# Patient Record
Sex: MALE | Race: WHITE | NOT HISPANIC OR LATINO | Employment: FULL TIME | URBAN - NONMETROPOLITAN AREA
[De-identification: names, ages, dates, MRNs, and addresses within clinical notes are randomized per-mention and may not be internally consistent; named-entity substitution may affect disease eponyms.]

---

## 2020-10-02 ENCOUNTER — TELEPHONE (OUTPATIENT)
Dept: GASTROENTEROLOGY | Facility: CLINIC | Age: 50
End: 2020-10-02

## 2020-10-02 ENCOUNTER — TELEMEDICINE (OUTPATIENT)
Dept: GASTROENTEROLOGY | Facility: CLINIC | Age: 50
End: 2020-10-02

## 2020-10-02 VITALS — BODY MASS INDEX: 37.03 KG/M2 | WEIGHT: 250 LBS | HEIGHT: 69 IN

## 2020-10-02 DIAGNOSIS — Z12.11 ENCOUNTER FOR SCREENING FOR MALIGNANT NEOPLASM OF COLON: Primary | ICD-10-CM

## 2020-10-02 DIAGNOSIS — E11.9 TYPE 2 DIABETES MELLITUS WITHOUT COMPLICATION, WITHOUT LONG-TERM CURRENT USE OF INSULIN (HCC): ICD-10-CM

## 2020-10-02 DIAGNOSIS — I10 ESSENTIAL HYPERTENSION: ICD-10-CM

## 2020-10-02 DIAGNOSIS — Z80.0 FAMILY HX OF COLON CANCER: ICD-10-CM

## 2020-10-02 PROCEDURE — S0285 CNSLT BEFORE SCREEN COLONOSC: HCPCS | Performed by: NURSE PRACTITIONER

## 2020-10-02 RX ORDER — GLIMEPIRIDE 4 MG/1
4 TABLET ORAL DAILY
COMMUNITY
Start: 2020-07-06

## 2020-10-02 RX ORDER — LISINOPRIL AND HYDROCHLOROTHIAZIDE 20; 12.5 MG/1; MG/1
1 TABLET ORAL DAILY
COMMUNITY
Start: 2020-07-06

## 2020-10-02 RX ORDER — POLYETHYLENE GLYCOL 3350, SODIUM SULFATE ANHYDROUS, SODIUM BICARBONATE, SODIUM CHLORIDE, POTASSIUM CHLORIDE 236; 22.74; 6.74; 5.86; 2.97 G/4L; G/4L; G/4L; G/4L; G/4L
4 POWDER, FOR SOLUTION ORAL ONCE
Qty: 4000 ML | Refills: 0 | Status: SHIPPED | OUTPATIENT
Start: 2020-10-02 | End: 2020-10-02

## 2020-10-02 RX ORDER — AMLODIPINE BESYLATE 5 MG/1
5 TABLET ORAL DAILY
COMMUNITY
Start: 2020-07-06
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
## 2022-02-10 ENCOUNTER — PREP FOR SURGERY (OUTPATIENT)
Dept: OTHER | Facility: HOSPITAL | Age: 52
End: 2022-02-10

## 2022-02-10 DIAGNOSIS — Z80.0 FAMILY HISTORY OF COLON CANCER: ICD-10-CM

## 2022-02-10 DIAGNOSIS — Z12.11 ENCOUNTER FOR SCREENING FOR MALIGNANT NEOPLASM OF COLON: Primary | ICD-10-CM

## 2022-03-18 ENCOUNTER — ANESTHESIA EVENT (OUTPATIENT)
Dept: GASTROENTEROLOGY | Facility: HOSPITAL | Age: 52
End: 2022-03-18

## 2022-03-18 ENCOUNTER — HOSPITAL ENCOUNTER (OUTPATIENT)
Facility: HOSPITAL | Age: 52
Setting detail: HOSPITAL OUTPATIENT SURGERY
Discharge: HOME | End: 2022-03-18
Attending: INTERNAL MEDICINE | Admitting: INTERNAL MEDICINE

## 2022-03-18 ENCOUNTER — ANESTHESIA (OUTPATIENT)
Dept: GASTROENTEROLOGY | Facility: HOSPITAL | Age: 52
End: 2022-03-18

## 2022-03-18 VITALS
TEMPERATURE: 97.5 F | BODY MASS INDEX: 32.78 KG/M2 | OXYGEN SATURATION: 98 % | HEART RATE: 77 BPM | WEIGHT: 242 LBS | HEIGHT: 72 IN | SYSTOLIC BLOOD PRESSURE: 146 MMHG | DIASTOLIC BLOOD PRESSURE: 78 MMHG | RESPIRATION RATE: 18 BRPM

## 2022-03-18 DIAGNOSIS — Z80.0 FAMILY HISTORY OF COLON CANCER: ICD-10-CM

## 2022-03-18 DIAGNOSIS — Z12.11 ENCOUNTER FOR SCREENING FOR MALIGNANT NEOPLASM OF COLON: ICD-10-CM

## 2022-03-18 LAB — GLUCOSE BLDC GLUCOMTR-MCNC: 182 MG/DL (ref 70–130)

## 2022-03-18 PROCEDURE — 82962 GLUCOSE BLOOD TEST: CPT

## 2022-03-18 PROCEDURE — 45385 COLONOSCOPY W/LESION REMOVAL: CPT | Performed by: INTERNAL MEDICINE

## 2022-03-18 PROCEDURE — 25010000002 PROPOFOL 10 MG/ML EMULSION: Performed by: NURSE ANESTHETIST, CERTIFIED REGISTERED

## 2022-03-18 PROCEDURE — 88305 TISSUE EXAM BY PATHOLOGIST: CPT | Performed by: INTERNAL MEDICINE

## 2022-03-18 RX ORDER — LIDOCAINE HYDROCHLORIDE 10 MG/ML
0.5 INJECTION, SOLUTION EPIDURAL; INFILTRATION; INTRACAUDAL; PERINEURAL ONCE AS NEEDED
Status: DISCONTINUED | OUTPATIENT
Start: 2022-03-18 | End: 2022-03-18 | Stop reason: HOSPADM

## 2022-03-18 RX ORDER — ONDANSETRON 2 MG/ML
4 INJECTION INTRAMUSCULAR; INTRAVENOUS ONCE AS NEEDED
Status: DISCONTINUED | OUTPATIENT
Start: 2022-03-18 | End: 2022-03-18 | Stop reason: HOSPADM

## 2022-03-18 RX ORDER — LIDOCAINE HYDROCHLORIDE 20 MG/ML
INJECTION, SOLUTION EPIDURAL; INFILTRATION; INTRACAUDAL; PERINEURAL AS NEEDED
Status: DISCONTINUED | OUTPATIENT
Start: 2022-03-18 | End: 2022-03-18 | Stop reason: SURG

## 2022-03-18 RX ORDER — PROPOFOL 10 MG/ML
VIAL (ML) INTRAVENOUS AS NEEDED
Status: DISCONTINUED | OUTPATIENT
Start: 2022-03-18 | End: 2022-03-18 | Stop reason: SURG

## 2022-03-18 RX ORDER — SODIUM CHLORIDE 0.9 % (FLUSH) 0.9 %
10 SYRINGE (ML) INJECTION AS NEEDED
Status: DISCONTINUED | OUTPATIENT
Start: 2022-03-18 | End: 2022-03-18 | Stop reason: HOSPADM

## 2022-03-18 RX ORDER — SODIUM CHLORIDE 9 MG/ML
500 INJECTION, SOLUTION INTRAVENOUS CONTINUOUS PRN
Status: DISCONTINUED | OUTPATIENT
Start: 2022-03-18 | End: 2022-03-18 | Stop reason: HOSPADM

## 2022-03-18 RX ADMIN — LIDOCAINE HYDROCHLORIDE 60 MG: 20 INJECTION, SOLUTION EPIDURAL; INFILTRATION; INTRACAUDAL; PERINEURAL at 08:56

## 2022-03-18 RX ADMIN — SODIUM CHLORIDE 500 ML: 9 INJECTION, SOLUTION INTRAVENOUS at 07:47

## 2022-03-18 RX ADMIN — PROPOFOL 200 MG: 10 INJECTION, EMULSION INTRAVENOUS at 08:56

## 2022-03-18 RX ADMIN — PROPOFOL 140 MG: 10 INJECTION, EMULSION INTRAVENOUS at 08:59

## 2022-03-18 NOTE — ANESTHESIA POSTPROCEDURE EVALUATION
"Patient: Roger Brambila    Procedure Summary     Date: 03/18/22 Room / Location: [facility] ENDOSCOPY 5 / [facility] ENDOSCOPY    Anesthesia Start: 0853 Anesthesia Stop: 0920    Procedure: COLONOSCOPY (N/A ) Diagnosis:       Encounter for screening for malignant neoplasm of colon      Family history of colon cancer      (Encounter for screening for malignant neoplasm of colon [Z12.11])      (Family history of colon cancer [Z80.0])    Surgeons: Carroll Sosa MD Provider: Josef Herr CRNA    Anesthesia Type: MAC ASA Status: 2          Anesthesia Type: MAC    Vitals  Vitals Value Taken Time   /75 03/18/22 0931   Temp     Pulse 76 03/18/22 0934   Resp 17 03/18/22 0930   SpO2 98 % 03/18/22 0934   Vitals shown include unvalidated device data.        Post Anesthesia Care and Evaluation    Patient location during evaluation: PACU  Patient participation: complete - patient participated  Level of consciousness: awake and alert  Pain management: adequate  Airway patency: patent  Anesthetic complications: No anesthetic complications    Cardiovascular status: acceptable  Respiratory status: acceptable  Hydration status: acceptable    Comments: Blood pressure 105/75, pulse 78, temperature 97.5 °F (36.4 °C), temperature source Temporal, resp. rate 17, height 182.9 cm (72\"), weight 110 kg (242 lb), SpO2 95 %.    Pt discharged from PACU based on meme score >8      "

## 2022-03-18 NOTE — ANESTHESIA PREPROCEDURE EVALUATION
Anesthesia Evaluation     Patient summary reviewed   no history of anesthetic complications:  NPO Solid Status: > 8 hours             Airway   Mallampati: II  Dental      Pulmonary - negative pulmonary ROS   Cardiovascular   Exercise tolerance: excellent (>7 METS)    (+) hypertension,       Neuro/Psych- negative ROS  GI/Hepatic/Renal/Endo    (+) obesity,   diabetes mellitus,     Musculoskeletal     Abdominal    Substance History      OB/GYN          Other                        Anesthesia Plan    ASA 2     MAC       Anesthetic plan, all risks, benefits, and alternatives have been provided, discussed and informed consent has been obtained with: patient.        CODE STATUS:
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
## 2022-03-21 LAB
LAB AP CASE REPORT: NORMAL
PATH REPORT.FINAL DX SPEC: NORMAL
PATH REPORT.GROSS SPEC: NORMAL

## 2022-12-12 DIAGNOSIS — D45 POLYCYTHEMIA VERA (HCC): Primary | ICD-10-CM
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
## 2022-12-13 ENCOUNTER — HOSPITAL ENCOUNTER (OUTPATIENT)
Dept: INFUSION THERAPY | Age: 52
Discharge: HOME | End: 2022-12-13
Payer: COMMERCIAL

## 2022-12-13 ENCOUNTER — OFFICE VISIT (OUTPATIENT)
Dept: HEMATOLOGY | Age: 52
End: 2022-12-13
Payer: COMMERCIAL

## 2022-12-13 VITALS
HEART RATE: 84 BPM | SYSTOLIC BLOOD PRESSURE: 146 MMHG | HEIGHT: 70 IN | OXYGEN SATURATION: 96 % | WEIGHT: 252 LBS | BODY MASS INDEX: 36.08 KG/M2 | DIASTOLIC BLOOD PRESSURE: 100 MMHG

## 2022-12-13 DIAGNOSIS — D75.1 POLYCYTHEMIA: Primary | ICD-10-CM

## 2022-12-13 DIAGNOSIS — D45 POLYCYTHEMIA VERA (HCC): Primary | ICD-10-CM

## 2022-12-13 DIAGNOSIS — D75.1 POLYCYTHEMIA: ICD-10-CM

## 2022-12-13 LAB
BASOPHILS ABSOLUTE: 0.18 K/UL (ref 0.01–0.08)
BASOPHILS RELATIVE PERCENT: 1.5 % (ref 0.1–1.2)
EOSINOPHILS ABSOLUTE: 0.55 K/UL (ref 0.04–0.54)
EOSINOPHILS RELATIVE PERCENT: 4.6 % (ref 0.7–7)
FERRITIN: 73.4 NG/ML (ref 30–400)
HCT VFR BLD CALC: 55 % (ref 40.1–51)
HEMOGLOBIN: 18.7 G/DL (ref 13.7–17.5)
IRON SATURATION: 24 % (ref 14–50)
IRON: 110 UG/DL (ref 59–158)
LYMPHOCYTES ABSOLUTE: 2.2 K/UL (ref 1.18–3.74)
LYMPHOCYTES RELATIVE PERCENT: 18.2 % (ref 19.3–53.1)
MCH RBC QN AUTO: 29.6 PG (ref 25.7–32.2)
MCHC RBC AUTO-ENTMCNC: 34 G/DL (ref 32.3–36.5)
MCV RBC AUTO: 87 FL (ref 79–92.2)
MONOCYTES ABSOLUTE: 1.04 K/UL (ref 0.24–0.82)
MONOCYTES RELATIVE PERCENT: 8.6 % (ref 4.7–12.5)
NEUTROPHILS ABSOLUTE: 7.88 K/UL (ref 1.56–6.13)
NEUTROPHILS RELATIVE PERCENT: 65.2 % (ref 34–71.1)
PDW BLD-RTO: 13.3 % (ref 11.6–14.4)
PLATELET # BLD: 249 K/UL (ref 163–337)
PMV BLD AUTO: 10.9 FL (ref 7.4–10.4)
RBC # BLD: 6.32 M/UL (ref 4.63–6.08)
TOTAL IRON BINDING CAPACITY: 456 UG/DL (ref 250–400)
WBC # BLD: 12.08 K/UL (ref 4.23–9.07)

## 2022-12-13 PROCEDURE — 85025 COMPLETE CBC W/AUTO DIFF WBC: CPT

## 2022-12-13 PROCEDURE — 4004F PT TOBACCO SCREEN RCVD TLK: CPT | Performed by: PHYSICIAN ASSISTANT

## 2022-12-13 PROCEDURE — G8427 DOCREV CUR MEDS BY ELIG CLIN: HCPCS | Performed by: PHYSICIAN ASSISTANT

## 2022-12-13 PROCEDURE — 99212 OFFICE O/P EST SF 10 MIN: CPT

## 2022-12-13 PROCEDURE — 99203 OFFICE O/P NEW LOW 30 MIN: CPT | Performed by: PHYSICIAN ASSISTANT

## 2022-12-13 PROCEDURE — G8417 CALC BMI ABV UP PARAM F/U: HCPCS | Performed by: PHYSICIAN ASSISTANT

## 2022-12-13 PROCEDURE — 3017F COLORECTAL CA SCREEN DOC REV: CPT | Performed by: PHYSICIAN ASSISTANT

## 2022-12-13 PROCEDURE — G8484 FLU IMMUNIZE NO ADMIN: HCPCS | Performed by: PHYSICIAN ASSISTANT

## 2022-12-13 PROCEDURE — 36415 COLL VENOUS BLD VENIPUNCTURE: CPT

## 2022-12-13 PROCEDURE — 99195 PHLEBOTOMY: CPT

## 2022-12-13 RX ORDER — EMPAGLIFLOZIN 10 MG/1
TABLET, FILM COATED ORAL
COMMUNITY
Start: 2022-11-01

## 2022-12-13 RX ORDER — ORAL SEMAGLUTIDE 14 MG/1
TABLET ORAL
COMMUNITY
Start: 2022-11-15

## 2022-12-13 RX ORDER — ROSUVASTATIN CALCIUM 40 MG/1
TABLET, COATED ORAL
COMMUNITY
Start: 2022-11-14

## 2022-12-13 ASSESSMENT — ENCOUNTER SYMPTOMS
DIARRHEA: 0
WHEEZING: 0
VOICE CHANGE: 0
BLOOD IN STOOL: 0
ABDOMINAL DISTENTION: 0
EYE DISCHARGE: 0
NAUSEA: 0
VOMITING: 0
PHOTOPHOBIA: 0
BACK PAIN: 1
ABDOMINAL PAIN: 1
SHORTNESS OF BREATH: 0
TROUBLE SWALLOWING: 0
COLOR CHANGE: 0
EYE ITCHING: 0
SORE THROAT: 0
COUGH: 1
CONSTIPATION: 0

## 2022-12-13 NOTE — PROGRESS NOTES
THERAPEUTIC PHLEBOTOMY    Most recent Hemoglobin 18.7 Gm/dl and Hematocrit 55%    Date obtained: 12 /13 /2022      Pre-phlebotomy Vital Signs: Refer to Doc flow sheet    Start time: 1020    Tourniquet placed on patient's arm and arm palpated for venous access. Area of venous access cleansed with alcohol. Sheath removed from the needle and needle inserted into the right antecubital site. Blood flowing well down the tubing into collection bag. Adjustment/no adjustment of needle needed to maintain adequate blood flow. Scale monitoring amount of blood in bag. Stop Time: 1036  Needle removed from patient's arm. Pressure applied to patient's arm until bleeding stopped. Dry sterile dressing applied over puncture site and secured with Coban/self-adherent wrap. Final amount of blood removed: 250      Patient tolerated procedure well. No redness, edema, or signs of active bleeding noted. Discharge Instructions provided: No heavy pushing or lifting for the next 24 hours. Keep dressing dry and in place for 4 to 6 hours. If a fever GREATER than 100.5 develops, contact office. Patient discharged ambulatory with belongings.

## 2022-12-15 LAB — ERYTHROPOIETIN: 4 MU/ML (ref 4–27)

## 2022-12-21 ENCOUNTER — TELEPHONE (OUTPATIENT)
Dept: HEMATOLOGY | Age: 52
End: 2022-12-21

## 2022-12-22 LAB
BACKGROUND, 480093: NORMAL
BACKGROUND, 489163: NORMAL
CALR MUTATION DETECTION RESULT, 489451: NORMAL
DIRECTOR REVIEW: NORMAL
EXTRACTION: NORMAL
JAK2 EXONS 12-15 MUT DET PCR: NORMAL
JAK2 V617F MUTATION DETECTION 489201: NORMAL
Lab: NORMAL
METHOD: NORMAL
MPL MUTATION ANALYSIS RESULT: NORMAL
REFERENCES: NORMAL
REFERENCES: NORMAL
REFLEX: NORMAL

## 2022-12-30 ENCOUNTER — TELEPHONE (OUTPATIENT)
Dept: HEMATOLOGY | Age: 52
End: 2022-12-30

## 2022-12-30 NOTE — TELEPHONE ENCOUNTER
SABRINA Carr called patient to follow up on previous visit and to complete [facility] Screening. Patient was unavailable at this time and this SW was unable to leave a voice message.
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
## 2023-01-24 ENCOUNTER — OFFICE VISIT (OUTPATIENT)
Dept: HEMATOLOGY | Age: 53
End: 2023-01-24
Payer: COMMERCIAL

## 2023-01-24 ENCOUNTER — HOSPITAL ENCOUNTER (OUTPATIENT)
Dept: INFUSION THERAPY | Age: 53
Discharge: HOME | End: 2023-01-24
Payer: COMMERCIAL

## 2023-01-24 VITALS
DIASTOLIC BLOOD PRESSURE: 70 MMHG | OXYGEN SATURATION: 97 % | WEIGHT: 250 LBS | SYSTOLIC BLOOD PRESSURE: 122 MMHG | HEART RATE: 86 BPM | BODY MASS INDEX: 35.79 KG/M2 | HEIGHT: 70 IN

## 2023-01-24 DIAGNOSIS — D45 POLYCYTHEMIA VERA (HCC): Primary | ICD-10-CM

## 2023-01-24 DIAGNOSIS — D75.1 POLYCYTHEMIA: Primary | ICD-10-CM

## 2023-01-24 LAB
BASOPHILS ABSOLUTE: 0.13 K/UL (ref 0.01–0.08)
BASOPHILS RELATIVE PERCENT: 1.3 % (ref 0.1–1.2)
EOSINOPHILS ABSOLUTE: 0.45 K/UL (ref 0.04–0.54)
EOSINOPHILS RELATIVE PERCENT: 4.5 % (ref 0.7–7)
HCT VFR BLD CALC: 52.5 % (ref 40.1–51)
HEMOGLOBIN: 17.3 G/DL (ref 13.7–17.5)
LYMPHOCYTES ABSOLUTE: 2.25 K/UL (ref 1.18–3.74)
LYMPHOCYTES RELATIVE PERCENT: 22.6 % (ref 19.3–53.1)
MCH RBC QN AUTO: 29.2 PG (ref 25.7–32.2)
MCHC RBC AUTO-ENTMCNC: 33 G/DL (ref 32.3–36.5)
MCV RBC AUTO: 88.7 FL (ref 79–92.2)
MONOCYTES ABSOLUTE: 0.82 K/UL (ref 0.24–0.82)
MONOCYTES RELATIVE PERCENT: 8.2 % (ref 4.7–12.5)
NEUTROPHILS ABSOLUTE: 6.2 K/UL (ref 1.56–6.13)
NEUTROPHILS RELATIVE PERCENT: 62.5 % (ref 34–71.1)
PDW BLD-RTO: 13.7 % (ref 11.6–14.4)
PLATELET # BLD: 206 K/UL (ref 163–337)
PMV BLD AUTO: 11.6 FL (ref 7.4–10.4)
RBC # BLD: 5.92 M/UL (ref 4.63–6.08)
WBC # BLD: 9.94 K/UL (ref 4.23–9.07)

## 2023-01-24 PROCEDURE — 1036F TOBACCO NON-USER: CPT | Performed by: PHYSICIAN ASSISTANT

## 2023-01-24 PROCEDURE — 99213 OFFICE O/P EST LOW 20 MIN: CPT | Performed by: PHYSICIAN ASSISTANT

## 2023-01-24 PROCEDURE — 99195 PHLEBOTOMY: CPT

## 2023-01-24 PROCEDURE — 3017F COLORECTAL CA SCREEN DOC REV: CPT | Performed by: PHYSICIAN ASSISTANT

## 2023-01-24 PROCEDURE — 99212 OFFICE O/P EST SF 10 MIN: CPT

## 2023-01-24 PROCEDURE — 36415 COLL VENOUS BLD VENIPUNCTURE: CPT

## 2023-01-24 PROCEDURE — 85025 COMPLETE CBC W/AUTO DIFF WBC: CPT

## 2023-01-24 PROCEDURE — G8417 CALC BMI ABV UP PARAM F/U: HCPCS | Performed by: PHYSICIAN ASSISTANT

## 2023-01-24 PROCEDURE — G8427 DOCREV CUR MEDS BY ELIG CLIN: HCPCS | Performed by: PHYSICIAN ASSISTANT

## 2023-01-24 PROCEDURE — G8484 FLU IMMUNIZE NO ADMIN: HCPCS | Performed by: PHYSICIAN ASSISTANT

## 2023-01-24 ASSESSMENT — ENCOUNTER SYMPTOMS
PHOTOPHOBIA: 0
SORE THROAT: 0
BACK PAIN: 1
SHORTNESS OF BREATH: 0
EYE DISCHARGE: 0
WHEEZING: 0
EYE ITCHING: 0
TROUBLE SWALLOWING: 0
ABDOMINAL PAIN: 0
COUGH: 0
DIARRHEA: 0
ABDOMINAL DISTENTION: 0
BLOOD IN STOOL: 0
CONSTIPATION: 0
VOMITING: 0
COLOR CHANGE: 0
NAUSEA: 0
VOICE CHANGE: 0

## 2023-01-24 NOTE — PROGRESS NOTES
THERAPEUTIC PHLEBOTOMY    Most recent Hemoglobin 17.3 Gm/dl and Hematocrit 52.5%    Date obtained: 01 /24 /2023  Most recent Ferritin level 55.6 (If applicable)  Date obtained: 12 /13 /2022    Pre-phlebotomy Vital Signs: Refer to Doc flow sheet    Start time: 1050    Tourniquet placed on patient's arm and arm palpated for venous access. Area of venous access cleansed with alcohol. Sheath removed from the needle and needle inserted into the right antecubital site. Blood flowing well down the tubing into collection bag. Adjustment/no adjustment of needle needed to maintain adequate blood flow. Scale monitoring amount of blood in bag. Stop Time: 1106  Needle removed from patient's arm. Pressure applied to patient's arm until bleeding stopped. Dry sterile dressing applied over puncture site and secured with Coban/self-adherent wrap. Final amount of blood removed: 500 mL      Patient tolerated procedure well. No redness, edema, or signs of active bleeding noted. Discharge Instructions provided: No heavy pushing or lifting for the next 24 hours. Keep dressing dry and in place for 4 to 6 hours. If a fever GREATER than 100.5 develops, contact office. Patient discharged ambulatory with belongings.

## 2023-04-26 ENCOUNTER — TELEPHONE (OUTPATIENT)
Dept: INFUSION THERAPY | Age: 53
End: 2023-04-26

## 2023-04-26 DIAGNOSIS — D45 POLYCYTHEMIA VERA (HCC): Primary | ICD-10-CM

## 2023-04-26 NOTE — PROGRESS NOTES
Progress Note      Pt Name: Fox Mckeon  YOB: 1970  MRN: [id number]    Date of evaluation: 4/27/2023  History Obtained From:  patient, electronic medical record    CHIEF COMPLAINT:    Chief Complaint   Patient presents with    Follow-up     Pt here following up for Polycythemia. Current active problems  Secondary polycythemia    HISTORY OF PRESENT ILLNESS:    Fox Mckeon is a 48 y.o.  male followed in the office since 12/13/2022 for polycythemia. His work-up was unrevealing for MPN, elevated iron or abnormal erythropoietin. He appears to have a secondary polycythemia possibly related to undiagnosed sleep apnea. He has tolerated therapeutic phlebotomies well. He is getting phlebotomized whenever his hematocrit is above 53. He does have intermittent headaches. He does not have any significant headache today. He has intermittent itching just minimal itching at present. He denies any chest pain, visual disturbances. He does have diabetes mellitus continuing on metformin 1000 mg twice daily, Jardiance 10 mg daily, Rybelsus. He has essential hypertension on amlodipine 5 mg daily and lisinopril 5 mg daily. He does have hypercholesterolemia currently on rosuvastatin 40 daily. HEMATOLOGY HISTORY: Secondary polycythemia  Yola Guzman was seen in initial hematology consultation on 12/13/2022  referred by Dr. Essie Meyer for evaluation of polycythemia. CBC 1/14/2022 revealed a WBC of 8.0 with 57% PMN, 25% lymphocyte, 10% monocyte, Hgb 17.1, HCT 49.9, ,000. Yola Guzman denies any prior history of blood dyscrasia. He denies taking any exogenous testosterone. He does have a prior history of smoking but quit in 2011. He denies any diagnosis of COPD, sleep apnea at present. His initial CBC in the office on 12/13/2022 revealed a WBC of 12.08 with 65.2% PMN, 18.2% lymphocyte, 8.6% monocyte, 4.6% eosinophil, 1.5% basophil. Hgb 18.7, HCT 55, ,000.     I reviewed the

## 2023-04-27 ENCOUNTER — HOSPITAL ENCOUNTER (OUTPATIENT)
Dept: INFUSION THERAPY | Age: 53
Discharge: HOME | End: 2023-04-27
Payer: COMMERCIAL

## 2023-04-27 ENCOUNTER — OFFICE VISIT (OUTPATIENT)
Dept: HEMATOLOGY | Age: 53
End: 2023-04-27
Payer: COMMERCIAL

## 2023-04-27 VITALS
BODY MASS INDEX: 36.51 KG/M2 | WEIGHT: 255 LBS | OXYGEN SATURATION: 97 % | DIASTOLIC BLOOD PRESSURE: 90 MMHG | SYSTOLIC BLOOD PRESSURE: 130 MMHG | HEART RATE: 81 BPM | HEIGHT: 70 IN

## 2023-04-27 DIAGNOSIS — D45 POLYCYTHEMIA VERA (HCC): ICD-10-CM

## 2023-04-27 DIAGNOSIS — D75.1 POLYCYTHEMIA: Primary | ICD-10-CM

## 2023-04-27 LAB
BASOPHILS # BLD: 0.11 K/UL (ref 0.01–0.08)
BASOPHILS NFR BLD: 1.2 % (ref 0.1–1.2)
EOSINOPHIL # BLD: 0.39 K/UL (ref 0.04–0.54)
EOSINOPHIL NFR BLD: 4.2 % (ref 0.7–7)
ERYTHROCYTE [DISTWIDTH] IN BLOOD BY AUTOMATED COUNT: 12.6 % (ref 11.6–14.4)
HCT VFR BLD AUTO: 51.9 % (ref 40.1–51)
HGB BLD-MCNC: 16.7 G/DL (ref 13.7–17.5)
LYMPHOCYTES # BLD: 1.72 K/UL (ref 1.18–3.74)
LYMPHOCYTES NFR BLD: 18.6 % (ref 19.3–53.1)
MCH RBC QN AUTO: 28.3 PG (ref 25.7–32.2)
MCHC RBC AUTO-ENTMCNC: 32.2 G/DL (ref 32.3–36.5)
MCV RBC AUTO: 88 FL (ref 79–92.2)
MONOCYTES # BLD: 1.07 K/UL (ref 0.24–0.82)
MONOCYTES NFR BLD: 11.6 % (ref 4.7–12.5)
NEUTROPHILS # BLD: 5.83 K/UL (ref 1.56–6.13)
NEUTS SEG NFR BLD: 63.1 % (ref 34–71.1)
PLATELET # BLD AUTO: 183 K/UL (ref 163–337)
PMV BLD AUTO: 11.1 FL (ref 7.4–10.4)
RBC # BLD AUTO: 5.9 M/UL (ref 4.63–6.08)
WBC # BLD AUTO: 9.24 K/UL (ref 4.23–9.07)

## 2023-04-27 PROCEDURE — 36415 COLL VENOUS BLD VENIPUNCTURE: CPT

## 2023-04-27 PROCEDURE — 1036F TOBACCO NON-USER: CPT | Performed by: PHYSICIAN ASSISTANT

## 2023-04-27 PROCEDURE — 99211 OFF/OP EST MAY X REQ PHY/QHP: CPT

## 2023-04-27 PROCEDURE — 3017F COLORECTAL CA SCREEN DOC REV: CPT | Performed by: PHYSICIAN ASSISTANT

## 2023-04-27 PROCEDURE — G8417 CALC BMI ABV UP PARAM F/U: HCPCS | Performed by: PHYSICIAN ASSISTANT

## 2023-04-27 PROCEDURE — G8427 DOCREV CUR MEDS BY ELIG CLIN: HCPCS | Performed by: PHYSICIAN ASSISTANT

## 2023-04-27 PROCEDURE — 85025 COMPLETE CBC W/AUTO DIFF WBC: CPT

## 2023-04-27 PROCEDURE — 99213 OFFICE O/P EST LOW 20 MIN: CPT | Performed by: PHYSICIAN ASSISTANT

## 2023-04-27 RX ORDER — LISINOPRIL 5 MG/1
5 TABLET ORAL DAILY
COMMUNITY
Start: 2023-03-04

## 2023-04-27 RX ORDER — AMLODIPINE BESYLATE 5 MG/1
TABLET ORAL
COMMUNITY
Start: 2023-04-22

## 2023-04-27 ASSESSMENT — ENCOUNTER SYMPTOMS
VOICE CHANGE: 0
COUGH: 0
ABDOMINAL DISTENTION: 0
COLOR CHANGE: 0
DIARRHEA: 0
CONSTIPATION: 0
SORE THROAT: 0
SHORTNESS OF BREATH: 0
BLOOD IN STOOL: 0
NAUSEA: 0
EYE DISCHARGE: 0
ABDOMINAL PAIN: 0
VOMITING: 0
TROUBLE SWALLOWING: 0
BACK PAIN: 1
WHEEZING: 0
EYE ITCHING: 0
PHOTOPHOBIA: 0

## 2023-07-19 ENCOUNTER — TELEPHONE (OUTPATIENT)
Dept: INFUSION THERAPY | Age: 53
End: 2023-07-19

## 2023-07-19 NOTE — PROGRESS NOTES
erythema or rash. Neurological:      Mental Status: He is alert and oriented to person, place, and time. Psychiatric:         Thought Content: Thought content normal.     CBC reviewed by me  Lab Results   Component Value Date    WBC 10.14 (H) 07/20/2023    HGB 16.6 07/20/2023    HCT 49.1 07/20/2023    MCV 84.8 07/20/2023     07/20/2023     Lab Results   Component Value Date    NEUTROABS 5.77 07/20/2023       VISIT DIAGNOSES  1. Polycythemia        ASSESSMENT/PLAN:    1. Polycythemia -suspect secondary-possibly related to undiagnosed sleep apnea    He may have undiagnosed sleep apnea. Plan for therapeutic phlebotomy for HCT > 53. HCT today is only 49.1. He will not require a therapeutic phlebotomy. HEALTH MAINTENANCE    Colon cancer screening. Colonoscopy at [facility] on 3/18/2022 - One 6 mm polyp in the proximal transverse colon (PATH - tubular adenoma) one 4 mm polyp in the distal transverse colon (PATH benign polypoid colonic mucosa) One 5 mm polyp in the sigmoid colon (tubular adenoma-negative for high-grade dysplasia or malignancy). Follow-up 3 years    Prostate cancer screening. Per PCP       Return in about 4 months (around 11/20/2023) for With Alexandra Scott possible therapeutic phlebotomy.      Irina Higginbotham PA-C  3:01 PM  7/20/2023

## 2023-07-20 ENCOUNTER — HOSPITAL ENCOUNTER (OUTPATIENT)
Dept: INFUSION THERAPY | Age: 53
Discharge: HOME | End: 2023-07-20
Payer: COMMERCIAL

## 2023-07-20 ENCOUNTER — OFFICE VISIT (OUTPATIENT)
Dept: HEMATOLOGY | Age: 53
End: 2023-07-20
Payer: COMMERCIAL

## 2023-07-20 VITALS
DIASTOLIC BLOOD PRESSURE: 90 MMHG | WEIGHT: 256 LBS | BODY MASS INDEX: 36.73 KG/M2 | SYSTOLIC BLOOD PRESSURE: 130 MMHG | HEART RATE: 90 BPM | OXYGEN SATURATION: 96 %

## 2023-07-20 DIAGNOSIS — D75.1 POLYCYTHEMIA: Primary | ICD-10-CM

## 2023-07-20 DIAGNOSIS — D45 POLYCYTHEMIA VERA (HCC): ICD-10-CM

## 2023-07-20 LAB
ERYTHROCYTE [DISTWIDTH] IN BLOOD BY AUTOMATED COUNT: 13.1 % (ref 11.6–14.4)
HCT VFR BLD AUTO: 49.1 % (ref 40.1–51)
HGB BLD-MCNC: 16.6 G/DL (ref 13.7–17.5)
LYMPHOCYTES # BLD: 2.62 K/UL (ref 1.18–3.74)
LYMPHOCYTES NFR BLD: 25.8 % (ref 19.3–53.1)
MCH RBC QN AUTO: 28.7 PG (ref 25.7–32.2)
MCHC RBC AUTO-ENTMCNC: 33.8 G/DL (ref 32.3–36.5)
MCV RBC AUTO: 84.8 FL (ref 79–92.2)
MONOCYTES # BLD: 0.99 K/UL (ref 0.24–0.82)
MONOCYTES NFR BLD: 9.8 % (ref 4.7–12.5)
NEUTROPHILS # BLD: 5.77 K/UL (ref 1.56–6.13)
NEUTS SEG NFR BLD: 56.9 % (ref 34–71.1)
PLATELET # BLD AUTO: 220 K/UL (ref 163–337)
PMV BLD AUTO: 10.7 FL (ref 7.4–10.4)
RBC # BLD AUTO: 5.79 M/UL (ref 4.63–6.08)
WBC # BLD AUTO: 10.14 K/UL (ref 4.23–9.07)

## 2023-07-20 PROCEDURE — 1036F TOBACCO NON-USER: CPT | Performed by: PHYSICIAN ASSISTANT

## 2023-07-20 PROCEDURE — 36415 COLL VENOUS BLD VENIPUNCTURE: CPT

## 2023-07-20 PROCEDURE — G8427 DOCREV CUR MEDS BY ELIG CLIN: HCPCS | Performed by: PHYSICIAN ASSISTANT

## 2023-07-20 PROCEDURE — G8417 CALC BMI ABV UP PARAM F/U: HCPCS | Performed by: PHYSICIAN ASSISTANT

## 2023-07-20 PROCEDURE — 99213 OFFICE O/P EST LOW 20 MIN: CPT | Performed by: PHYSICIAN ASSISTANT

## 2023-07-20 PROCEDURE — 85025 COMPLETE CBC W/AUTO DIFF WBC: CPT

## 2023-07-20 PROCEDURE — 3017F COLORECTAL CA SCREEN DOC REV: CPT | Performed by: PHYSICIAN ASSISTANT

## 2023-07-20 PROCEDURE — 99211 OFF/OP EST MAY X REQ PHY/QHP: CPT

## 2023-07-20 ASSESSMENT — ENCOUNTER SYMPTOMS
EYE ITCHING: 0
BLOOD IN STOOL: 0
VOMITING: 0
COLOR CHANGE: 0
ABDOMINAL DISTENTION: 0
TROUBLE SWALLOWING: 0
COUGH: 0
NAUSEA: 0
ABDOMINAL PAIN: 0
BACK PAIN: 1
PHOTOPHOBIA: 0
CONSTIPATION: 0
SHORTNESS OF BREATH: 0
SORE THROAT: 0
WHEEZING: 0
DIARRHEA: 0
VOICE CHANGE: 0
EYE DISCHARGE: 0

## 2023-08-23 ENCOUNTER — HOSPITAL ENCOUNTER (EMERGENCY)
Age: 53
Discharge: ANOTHER HOSPITAL | End: 2023-08-23
Attending: EMERGENCY MEDICINE
Payer: COMMERCIAL

## 2023-08-23 ENCOUNTER — APPOINTMENT (OUTPATIENT)
Dept: GENERAL RADIOLOGY | Age: 53
End: 2023-08-23
Payer: COMMERCIAL

## 2023-08-23 VITALS
OXYGEN SATURATION: 97 % | SYSTOLIC BLOOD PRESSURE: 109 MMHG | WEIGHT: 260 LBS | BODY MASS INDEX: 37.31 KG/M2 | TEMPERATURE: 97 F | DIASTOLIC BLOOD PRESSURE: 60 MMHG | HEART RATE: 89 BPM | RESPIRATION RATE: 20 BRPM
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
## 2023-08-23 DIAGNOSIS — S09.90XA TRAUMATIC INJURY OF HEAD, INITIAL ENCOUNTER: Primary | ICD-10-CM

## 2023-08-23 LAB
ALBUMIN SERPL-MCNC: 3.9 G/DL (ref 3.5–5.2)
ALP SERPL-CCNC: 131 U/L (ref 40–130)
ALT SERPL-CCNC: 330 U/L (ref 5–41)
ANION GAP SERPL CALCULATED.3IONS-SCNC: 15 MMOL/L (ref 7–19)
AST SERPL-CCNC: 432 U/L (ref 5–40)
BASOPHILS # BLD: 0.2 K/UL (ref 0–0.2)
BASOPHILS NFR BLD: 1.2 % (ref 0–1)
BILIRUB SERPL-MCNC: <0.2 MG/DL (ref 0.2–1.2)
BUN SERPL-MCNC: 24 MG/DL (ref 6–20)
CALCIUM SERPL-MCNC: 8.3 MG/DL (ref 8.6–10)
CHLORIDE SERPL-SCNC: 94 MMOL/L (ref 98–111)
CO2 SERPL-SCNC: 19 MMOL/L (ref 22–29)
CREAT SERPL-MCNC: 1.1 MG/DL (ref 0.5–1.2)
EOSINOPHIL # BLD: 0.4 K/UL (ref 0–0.6)
EOSINOPHIL NFR BLD: 2.2 % (ref 0–5)
ERYTHROCYTE [DISTWIDTH] IN BLOOD BY AUTOMATED COUNT: 13 % (ref 11.5–14.5)
GLUCOSE SERPL-MCNC: 586 MG/DL (ref 74–109)
HCT VFR BLD AUTO: 51.1 % (ref 42–52)
HGB BLD-MCNC: 16.8 G/DL (ref 14–18)
IMM GRANULOCYTES # BLD: 0.7 K/UL
LYMPHOCYTES # BLD: 3.3 K/UL (ref 1.1–4.5)
LYMPHOCYTES NFR BLD: 19.7 % (ref 20–40)
MCH RBC QN AUTO: 28.8 PG (ref 27–31)
MCHC RBC AUTO-ENTMCNC: 32.9 G/DL (ref 33–37)
MCV RBC AUTO: 87.7 FL (ref 80–94)
MONOCYTES # BLD: 0.9 K/UL (ref 0–0.9)
MONOCYTES NFR BLD: 5.1 % (ref 0–10)
NEUTROPHILS # BLD: 11.3 K/UL (ref 1.5–7.5)
NEUTS SEG NFR BLD: 67.4 % (ref 50–65)
PLATELET # BLD AUTO: 358 K/UL (ref 130–400)
PMV BLD AUTO: 10.8 FL (ref 9.4–12.4)
POTASSIUM SERPL-SCNC: 3.8 MMOL/L (ref 3.5–5)
PROT SERPL-MCNC: 6.9 G/DL (ref 6.6–8.7)
RBC # BLD AUTO: 5.83 M/UL (ref 4.7–6.1)
SODIUM SERPL-SCNC: 128 MMOL/L (ref 136–145)
WBC # BLD AUTO: 16.8 K/UL (ref 4.8–10.8)

## 2023-08-23 PROCEDURE — 85025 COMPLETE CBC W/AUTO DIFF WBC: CPT

## 2023-08-23 PROCEDURE — 6360000002 HC RX W HCPCS

## 2023-08-23 PROCEDURE — 2580000003 HC RX 258: Performed by: EMERGENCY MEDICINE

## 2023-08-23 PROCEDURE — 71045 X-RAY EXAM CHEST 1 VIEW: CPT

## 2023-08-23 PROCEDURE — 72170 X-RAY EXAM OF PELVIS: CPT

## 2023-08-23 PROCEDURE — 96374 THER/PROPH/DIAG INJ IV PUSH: CPT

## 2023-08-23 PROCEDURE — 80053 COMPREHEN METABOLIC PANEL: CPT

## 2023-08-23 PROCEDURE — 31500 INSERT EMERGENCY AIRWAY: CPT

## 2023-08-23 PROCEDURE — 36415 COLL VENOUS BLD VENIPUNCTURE: CPT

## 2023-08-23 PROCEDURE — 99285 EMERGENCY DEPT VISIT HI MDM: CPT

## 2023-08-23 RX ORDER — PROPOFOL 10 MG/ML
INJECTION, EMULSION INTRAVENOUS
Status: COMPLETED
Start: 2023-08-23 | End: 2023-08-23

## 2023-08-23 RX ORDER — PROPOFOL 10 MG/ML
5-50 INJECTION, EMULSION INTRAVENOUS CONTINUOUS
Status: DISCONTINUED | OUTPATIENT
Start: 2023-08-23 | End: 2023-08-23 | Stop reason: HOSPADM

## 2023-08-23 RX ORDER — SODIUM CHLORIDE, SODIUM LACTATE, POTASSIUM CHLORIDE, AND CALCIUM CHLORIDE .6; .31; .03; .02 G/100ML; G/100ML; G/100ML; G/100ML
1000 INJECTION, SOLUTION INTRAVENOUS ONCE
Status: COMPLETED | OUTPATIENT
Start: 2023-08-23 | End: 2023-08-23

## 2023-08-23 RX ORDER — LORAZEPAM 2 MG/ML
INJECTION INTRAMUSCULAR
Status: COMPLETED
Start: 2023-08-23 | End: 2023-08-23

## 2023-08-23 RX ADMIN — PROPOFOL 5.02 MCG/KG/MIN: 10 INJECTION, EMULSION INTRAVENOUS at 09:05

## 2023-08-23 RX ADMIN — PROPOFOL 1000 MG: 10 INJECTION, EMULSION INTRAVENOUS at 08:24

## 2023-08-23 RX ADMIN — SODIUM CHLORIDE, SODIUM LACTATE, POTASSIUM CHLORIDE, AND CALCIUM CHLORIDE 1000 ML: 600; 310; 30; 20 INJECTION, SOLUTION INTRAVENOUS at 08:44

## 2023-08-23 RX ADMIN — LORAZEPAM 2 MG: 2 INJECTION INTRAMUSCULAR; INTRAVENOUS at 08:44

## 2023-08-23 ASSESSMENT — PULMONARY FUNCTION TESTS: PIF_VALUE: 23

## 2023-08-23 NOTE — PROGRESS NOTES
Pt intubated at 0811 with 7.5 ett at 23 cm stephon at lip. Pt had igel in place and exchanged to to an ett. Pt placed on vent AC 16 500 60% 5 peep v.o Dr. Maria Velarde. Tube pushed down to 24cm stephon at lip after x ray.

## 2023-08-23 NOTE — ED NOTES
Air Evac at the bedside OG placed by [employer].  Air vac Medic administering Ketamine at this time     Chepe Rogers, [address]  08/23/23 2590

## 2023-08-23 NOTE — ED NOTES
TXA and blood products started by [facility].  Blood started at [place], [place]  08/23/23 4037
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
## 2023-08-23 NOTE — ED NOTES
Nurse report to Amrita Fields RN by Linda Hylton.  [facility] RN     Andres Miranda, MITRA  08/23/23 4931

## 2023-08-23 NOTE — ED NOTES
Pt bucking vent MD aware of low b/p and pt not reting fully on vent     Neil Tarango RN  08/23/23 4590
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
## 2023-08-23 NOTE — ED NOTES
[facility]   ED to ED     Dr. Porter Travis, accepting     Report: [phone], Opt. 4    Fax chart: [address] [place] [address]  08/23/23 2550
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
## 2023-08-23 NOTE — ED NOTES
Accompanied Dr. Dia Reveal to speak with pt family to update on pt condition and pending [employer] flight to [facility].        Ayah Renteria RN  08/23/23 4389

## 2023-08-26 RX ORDER — LORAZEPAM 2 MG/ML
2 INJECTION INTRAMUSCULAR ONCE
Status: ACTIVE | OUTPATIENT
Start: 2023-08-23

## 2023-11-28 ENCOUNTER — TELEPHONE (OUTPATIENT)
Dept: HEMATOLOGY | Age: 53
End: 2023-11-28
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
## 2024-03-01 ENCOUNTER — TELEPHONE (OUTPATIENT)
Dept: HEMATOLOGY | Age: 54
End: 2024-03-01

## 2024-03-01 NOTE — TELEPHONE ENCOUNTER
Called pt to remind them of their appt on 03/05/2024 but was unable to leave message or speak to the patient due to invalid number or number was disconnected.

## 2024-04-04 ENCOUNTER — TRANSCRIBE ORDERS (OUTPATIENT)
Dept: ADMINISTRATIVE | Facility: HOSPITAL | Age: 54
End: 2024-04-04
Payer: COMMERCIAL

## 2024-04-04 ENCOUNTER — HOSPITAL ENCOUNTER (OUTPATIENT)
Age: 54
Discharge: HOME | End: 2024-04-04

## 2024-04-04 ENCOUNTER — OFFICE VISIT (OUTPATIENT)
Dept: WOUND CARE | Facility: HOSPITAL | Age: 54
End: 2024-04-04
Payer: OTHER MISCELLANEOUS

## 2024-04-04 ENCOUNTER — LAB (OUTPATIENT)
Dept: LAB | Facility: HOSPITAL | Age: 54
End: 2024-04-04
Payer: COMMERCIAL

## 2024-04-04 ENCOUNTER — HOSPITAL ENCOUNTER (OUTPATIENT)
Dept: GENERAL RADIOLOGY | Facility: HOSPITAL | Age: 54
Discharge: HOME | End: 2024-04-04
Payer: OTHER MISCELLANEOUS

## 2024-04-04 ENCOUNTER — LAB REQUISITION (OUTPATIENT)
Dept: LAB | Facility: HOSPITAL | Age: 54
End: 2024-04-04
Payer: OTHER MISCELLANEOUS

## 2024-04-04 DIAGNOSIS — E11.621 TYPE 2 DIABETES MELLITUS WITH FOOT ULCER (CODE): ICD-10-CM

## 2024-04-04 DIAGNOSIS — E11.621 TYPE 2 DIABETES MELLITUS WITH FOOT ULCER (CODE): Primary | ICD-10-CM

## 2024-04-04 LAB
ALBUMIN SERPL-MCNC: 4.4 G/DL (ref 3.5–5.2)
ALBUMIN/GLOB SERPL: 1.3 G/DL
ALP SERPL-CCNC: 130 U/L (ref 39–117)
ALT SERPL W P-5'-P-CCNC: 16 U/L (ref 1–41)
ANION GAP SERPL CALCULATED.3IONS-SCNC: 10 MMOL/L (ref 5–15)
AST SERPL-CCNC: 14 U/L (ref 1–40)
BACTERIA #/AREA URNS HPF: NORMAL /HPF
BASOPHILS # BLD AUTO: 0.08 10*3/MM3 (ref 0–0.2)
BASOPHILS NFR BLD AUTO: 1.2 % (ref 0–1.5)
BILIRUB SERPL-MCNC: 0.4 MG/DL (ref 0–1.2)
BILIRUB UR QL STRIP: NEGATIVE
BUN SERPL-MCNC: 22 MG/DL (ref 6–20)
BUN/CREAT SERPL: 26.8 (ref 7–25)
CALCIUM SPEC-SCNC: 9.2 MG/DL (ref 8.6–10.5)
CHLORIDE SERPL-SCNC: 102 MMOL/L (ref 98–107)
CLARITY UR: CLEAR
CO2 SERPL-SCNC: 25 MMOL/L (ref 22–29)
COLOR UR: YELLOW
CREAT SERPL-MCNC: 0.82 MG/DL (ref 0.76–1.27)
CRP SERPL-MCNC: <0.3 MG/DL (ref 0–0.5)
DEPRECATED RDW RBC AUTO: 48.2 FL (ref 37–54)
EGFRCR SERPLBLD CKD-EPI 2021: 104.4 ML/MIN/1.73
EOSINOPHIL # BLD AUTO: 0.28 10*3/MM3 (ref 0–0.4)
EOSINOPHIL NFR BLD AUTO: 4.1 % (ref 0.3–6.2)
ERYTHROCYTE [DISTWIDTH] IN BLOOD BY AUTOMATED COUNT: 14.7 % (ref 12.3–15.4)
ERYTHROCYTE [SEDIMENTATION RATE] IN BLOOD: 20 MM/HR (ref 0–20)
GLOBULIN UR ELPH-MCNC: 3.5 GM/DL
GLUCOSE SERPL-MCNC: 106 MG/DL (ref 65–99)
GLUCOSE UR STRIP.AUTO-MCNC: =>1000 MG/DL
HBA1C MFR BLD: 6.3 % (ref 4.8–5.6)
HCT VFR BLD AUTO: 42.6 % (ref 37.5–51)
HGB BLD-MCNC: 13.5 G/DL (ref 13–17.7)
HGB UR STRIP.AUTO-MCNC: NEGATIVE MG/L
IMM GRANULOCYTES # BLD AUTO: 0.02 10*3/MM3 (ref 0–0.05)
IMM GRANULOCYTES NFR BLD AUTO: 0.3 % (ref 0–0.5)
KETONES UR STRIP.AUTO-MCNC: NEGATIVE MG/DL
LEUKOCYTE ESTERASE UR QL STRIP.AUTO: NEGATIVE
LYMPHOCYTES # BLD AUTO: 1.39 10*3/MM3 (ref 0.7–3.1)
LYMPHOCYTES NFR BLD AUTO: 20.6 % (ref 19.6–45.3)
MCH RBC QN AUTO: 28.1 PG (ref 26.6–33)
MCHC RBC AUTO-ENTMCNC: 31.7 G/DL (ref 31.5–35.7)
MCV RBC AUTO: 88.6 FL (ref 79–97)
MONOCYTES # BLD AUTO: 0.63 10*3/MM3 (ref 0.1–0.9)
MONOCYTES NFR BLD AUTO: 9.3 % (ref 5–12)
NEUTROPHILS NFR BLD AUTO: 4.36 10*3/MM3 (ref 1.7–7)
NEUTROPHILS NFR BLD AUTO: 64.5 % (ref 42.7–76)
NITRITE UR QL STRIP.AUTO: NEGATIVE
NRBC BLD AUTO-RTO: 0 /100 WBC (ref 0–0.2)
PH UR STRIP.AUTO: 5.5 [PH] (ref 5–8)
PLATELET # BLD AUTO: 276 10*3/MM3 (ref 140–450)
PMV BLD AUTO: 10.3 FL (ref 6–12)
POTASSIUM SERPL-SCNC: 4.7 MMOL/L (ref 3.5–5.2)
PREALB SERPL-MCNC: 23.5 MG/DL (ref 20–40)
PROT SERPL-MCNC: 7.9 G/DL (ref 6–8.5)
PROT UR STRIP.AUTO-MCNC: 30 MG/DL
RBC # BLD AUTO: 4.81 10*6/MM3 (ref 4.14–5.8)
RBC #/AREA URNS HPF: NORMAL /HPF (ref 0–2)
SODIUM SERPL-SCNC: 137 MMOL/L (ref 136–145)
SP GR UR STRIP.AUTO: 1.03 (ref 1–1.03)
SQUAMOUS #/AREA URNS HPF: NORMAL /HPF
UROBILINOGEN UR STRIP.AUTO-MCNC: 0.2 E.U./DL
WBC #/AREA URNS HPF: NORMAL /HPF (ref 0–5)
WBC NRBC COR # BLD AUTO: 6.76 10*3/MM3 (ref 3.4–10.8)

## 2024-04-04 PROCEDURE — 87075 CULTR BACTERIA EXCEPT BLOOD: CPT | Performed by: NURSE PRACTITIONER

## 2024-04-04 PROCEDURE — 85652 RBC SED RATE AUTOMATED: CPT

## 2024-04-04 PROCEDURE — 73630 X-RAY EXAM OF FOOT: CPT

## 2024-04-04 PROCEDURE — 87070 CULTURE OTHR SPECIMN AEROBIC: CPT | Performed by: NURSE PRACTITIONER

## 2024-04-04 PROCEDURE — 87205 SMEAR GRAM STAIN: CPT | Performed by: NURSE PRACTITIONER

## 2024-04-04 PROCEDURE — 84134 ASSAY OF PREALBUMIN: CPT

## 2024-04-04 PROCEDURE — 80053 COMPREHEN METABOLIC PANEL: CPT

## 2024-04-04 PROCEDURE — 87186 SC STD MICRODIL/AGAR DIL: CPT | Performed by: NURSE PRACTITIONER

## 2024-04-04 PROCEDURE — 86140 C-REACTIVE PROTEIN: CPT

## 2024-04-04 PROCEDURE — 85025 COMPLETE CBC W/AUTO DIFF WBC: CPT

## 2024-04-04 PROCEDURE — G0463 HOSPITAL OUTPT CLINIC VISIT: HCPCS

## 2024-04-04 PROCEDURE — 87176 TISSUE HOMOGENIZATION CULTR: CPT | Performed by: NURSE PRACTITIONER

## 2024-04-04 PROCEDURE — 36415 COLL VENOUS BLD VENIPUNCTURE: CPT

## 2024-04-04 PROCEDURE — 87147 CULTURE TYPE IMMUNOLOGIC: CPT | Performed by: NURSE PRACTITIONER

## 2024-04-04 PROCEDURE — 83036 HEMOGLOBIN GLYCOSYLATED A1C: CPT

## 2024-04-07 LAB
BACTERIA SPEC AEROBE CULT: ABNORMAL
BACTERIA SPEC ANAEROBE CULT: NORMAL
GRAM STN SPEC: ABNORMAL
GRAM STN SPEC: ABNORMAL

## 2024-04-09 LAB — BACTERIA SPEC ANAEROBE CULT: NORMAL

## 2024-04-19 ENCOUNTER — OFFICE VISIT (OUTPATIENT)
Dept: WOUND CARE | Facility: HOSPITAL | Age: 54
End: 2024-04-19
Payer: OTHER MISCELLANEOUS

## 2024-04-19 PROCEDURE — G0463 HOSPITAL OUTPT CLINIC VISIT: HCPCS

## 2024-04-26 LAB
ALBUMIN SERPL-MCNC: 4.2 G/DL (ref 3.5–5.2)
ALP SERPL-CCNC: 99 U/L (ref 40–130)
ALT SERPL-CCNC: 14 U/L (ref 5–41)
ANION GAP SERPL CALCULATED.3IONS-SCNC: 12 MMOL/L (ref 7–19)
AST SERPL-CCNC: 12 U/L (ref 5–40)
BASOPHILS # BLD: 0.1 K/UL (ref 0–0.2)
BASOPHILS NFR BLD: 0.9 % (ref 0–1)
BILIRUB SERPL-MCNC: 0.3 MG/DL (ref 0.2–1.2)
BUN SERPL-MCNC: 19 MG/DL (ref 6–20)
CALCIUM SERPL-MCNC: 9.3 MG/DL (ref 8.6–10)
CHLORIDE SERPL-SCNC: 101 MMOL/L (ref 98–111)
CO2 SERPL-SCNC: 24 MMOL/L (ref 22–29)
CREAT SERPL-MCNC: 0.7 MG/DL (ref 0.5–1.2)
EOSINOPHIL # BLD: 0.1 K/UL (ref 0–0.6)
EOSINOPHIL NFR BLD: 1.9 % (ref 0–5)
ERYTHROCYTE [DISTWIDTH] IN BLOOD BY AUTOMATED COUNT: 14.2 % (ref 11.5–14.5)
GLUCOSE SERPL-MCNC: 166 MG/DL (ref 74–109)
HCT VFR BLD AUTO: 42.2 % (ref 42–52)
HGB BLD-MCNC: 13.5 G/DL (ref 14–18)
IMM GRANULOCYTES # BLD: 0 K/UL
LYMPHOCYTES # BLD: 1.2 K/UL (ref 1.1–4.5)
LYMPHOCYTES NFR BLD: 18.2 % (ref 20–40)
MCH RBC QN AUTO: 28.3 PG (ref 27–31)
MCHC RBC AUTO-ENTMCNC: 32 G/DL (ref 33–37)
MCV RBC AUTO: 88.5 FL (ref 80–94)
MONOCYTES # BLD: 0.5 K/UL (ref 0–0.9)
MONOCYTES NFR BLD: 7.4 % (ref 0–10)
NEUTROPHILS # BLD: 4.8 K/UL (ref 1.5–7.5)
NEUTS SEG NFR BLD: 71.2 % (ref 50–65)
PLATELET # BLD AUTO: 303 K/UL (ref 130–400)
PMV BLD AUTO: 10.2 FL (ref 9.4–12.4)
POTASSIUM SERPL-SCNC: 4.5 MMOL/L (ref 3.5–5)
PROT SERPL-MCNC: 7.1 G/DL (ref 6.6–8.7)
RBC # BLD AUTO: 4.77 M/UL (ref 4.7–6.1)
SODIUM SERPL-SCNC: 137 MMOL/L (ref 136–145)
T4 FREE SERPL-MCNC: 1.47 NG/DL (ref 0.93–1.7)
TSH SERPL DL<=0.005 MIU/L-ACNC: 1.07 UIU/ML (ref 0.27–4.2)
WBC # BLD AUTO: 6.7 K/UL (ref 4.8–10.8)

## 2024-04-27 LAB — T3 SERPL-MCNC: 80 NG/DL (ref 80–200)

## 2024-04-29 LAB — HBA1C MFR BLD: 6.4 % (ref 4–6)

## 2024-05-03 ENCOUNTER — OFFICE VISIT (OUTPATIENT)
Dept: WOUND CARE | Facility: HOSPITAL | Age: 54
End: 2024-05-03
Payer: OTHER MISCELLANEOUS

## 2024-05-03 PROCEDURE — G0463 HOSPITAL OUTPT CLINIC VISIT: HCPCS

## 2024-05-06 RX ORDER — BUPRENORPHINE 10 UG/H
1 PATCH TRANSDERMAL WEEKLY
COMMUNITY

## 2024-05-06 RX ORDER — ACETAMINOPHEN 325 MG/1
325 TABLET ORAL EVERY 6 HOURS PRN
COMMUNITY
Start: 2023-12-15

## 2024-05-06 RX ORDER — POLYETHYLENE GLYCOL 3350 17 G/17G
17 POWDER, FOR SOLUTION ORAL DAILY PRN
COMMUNITY

## 2024-05-06 RX ORDER — DOXAZOSIN MESYLATE 4 MG/1
4 TABLET ORAL NIGHTLY
COMMUNITY

## 2024-05-06 RX ORDER — SENNOSIDES A AND B 8.6 MG/1
1 TABLET, FILM COATED ORAL PRN
COMMUNITY

## 2024-05-06 RX ORDER — QUETIAPINE FUMARATE 50 MG/1
50 TABLET, FILM COATED ORAL PRN
COMMUNITY
Start: 2023-12-15

## 2024-05-06 RX ORDER — AMANTADINE HYDROCHLORIDE 100 MG/1
100 CAPSULE, GELATIN COATED ORAL 2 TIMES DAILY
COMMUNITY
Start: 2024-03-19

## 2024-05-06 RX ORDER — FAMOTIDINE 20 MG/1
20 TABLET, FILM COATED ORAL 2 TIMES DAILY
COMMUNITY

## 2024-05-06 RX ORDER — ATORVASTATIN CALCIUM 40 MG/1
40 TABLET, FILM COATED ORAL DAILY
COMMUNITY

## 2024-05-06 RX ORDER — ONDANSETRON 4 MG/1
4 TABLET, FILM COATED ORAL EVERY 8 HOURS PRN
COMMUNITY

## 2024-05-06 RX ORDER — DORZOLAMIDE HCL 20 MG/ML
1 SOLUTION/ DROPS OPHTHALMIC 2 TIMES DAILY
COMMUNITY

## 2024-05-06 RX ORDER — PROPRANOLOL HYDROCHLORIDE 20 MG/1
20 TABLET ORAL 3 TIMES DAILY
COMMUNITY

## 2024-05-06 RX ORDER — BRIMONIDINE TARTRATE 2 MG/ML
1 SOLUTION/ DROPS OPHTHALMIC 3 TIMES DAILY
COMMUNITY

## 2024-05-06 RX ORDER — OXYCODONE HYDROCHLORIDE 5 MG/1
5 TABLET ORAL EVERY 8 HOURS PRN
COMMUNITY

## 2024-05-06 RX ORDER — TRIAMCINOLONE ACETONIDE 1 MG/G
CREAM TOPICAL 2 TIMES DAILY
COMMUNITY

## 2024-05-14 ENCOUNTER — OFFICE VISIT (OUTPATIENT)
Dept: GASTROENTEROLOGY | Age: 54
End: 2024-05-14
Payer: COMMERCIAL

## 2024-05-14 VITALS
SYSTOLIC BLOOD PRESSURE: 160 MMHG | BODY MASS INDEX: 21.67 KG/M2 | OXYGEN SATURATION: 97 % | DIASTOLIC BLOOD PRESSURE: 100 MMHG | WEIGHT: 160 LBS | HEIGHT: 72 IN | HEART RATE: 75 BPM

## 2024-05-14 DIAGNOSIS — R13.10 DYSPHAGIA, UNSPECIFIED TYPE: ICD-10-CM

## 2024-05-14 DIAGNOSIS — R11.2 NAUSEA AND VOMITING, UNSPECIFIED VOMITING TYPE: Primary | ICD-10-CM

## 2024-05-14 PROCEDURE — 3017F COLORECTAL CA SCREEN DOC REV: CPT | Performed by: NURSE PRACTITIONER

## 2024-05-14 PROCEDURE — G8420 CALC BMI NORM PARAMETERS: HCPCS | Performed by: NURSE PRACTITIONER

## 2024-05-14 PROCEDURE — 1036F TOBACCO NON-USER: CPT | Performed by: NURSE PRACTITIONER

## 2024-05-14 PROCEDURE — 99204 OFFICE O/P NEW MOD 45 MIN: CPT | Performed by: NURSE PRACTITIONER

## 2024-05-14 PROCEDURE — G8427 DOCREV CUR MEDS BY ELIG CLIN: HCPCS | Performed by: NURSE PRACTITIONER

## 2024-05-14 RX ORDER — ALPRAZOLAM 0.25 MG/1
0.25 TABLET ORAL NIGHTLY PRN
COMMUNITY

## 2024-05-14 RX ORDER — OMEPRAZOLE 40 MG/1
40 CAPSULE, DELAYED RELEASE ORAL DAILY
Qty: 30 CAPSULE | Refills: 11 | Status: SHIPPED | OUTPATIENT
Start: 2024-05-14

## 2024-05-14 ASSESSMENT — ENCOUNTER SYMPTOMS
SHORTNESS OF BREATH: 0
ABDOMINAL DISTENTION: 0
TROUBLE SWALLOWING: 0
DIARRHEA: 0
ABDOMINAL PAIN: 0
CHOKING: 0
BLOOD IN STOOL: 0
COUGH: 0
VOMITING: 1
CONSTIPATION: 0
ANAL BLEEDING: 0
NAUSEA: 1

## 2024-05-14 NOTE — PROGRESS NOTES
cuff 12/9/2015    Diabetes mellitus (HCC)     Hypertension     Prolonged emergence from general anesthesia        Past Surgical History:   Procedure Laterality Date    CYSTOSCOPY      PILONIDAL CYST EXCISION      SHOULDER ARTHROSCOPY Right 12/9/2015    SHOULDER ARTHROSCOPY ROTATOR CUFF REPAIR,RT SHOULDER ARTHROSCOPIC RCR, SAD performed by Calvin Mo MD at [facility] OR       No family history on file.    Social History     Socioeconomic History    Marital status:    Tobacco Use    Smoking status: Never     Passive exposure: Yes   Substance and Sexual Activity    Alcohol use: No       Current Outpatient Medications   Medication Sig Dispense Refill    ALPRAZolam (XANAX) 0.25 MG tablet Take 1 tablet by mouth nightly as needed for Sleep. Max Daily Amount: 0.25 mg      omeprazole (PRILOSEC) 40 MG delayed release capsule Take 1 capsule by mouth daily Take first thing daily on an empty stomach. 30 capsule 11    amantadine (SYMMETREL) 100 MG capsule Take 1 capsule by mouth 2 times daily      atorvastatin (LIPITOR) 40 MG tablet Take 1 tablet by mouth daily      brimonidine (ALPHAGAN) 0.2 % ophthalmic solution 1 drop 3 times daily      buprenorphine (BUPRENEX) 10 MCG/HR PTWK Place 1 patch onto the skin once a week.      dorzolamide (TRUSOPT) 2 % ophthalmic solution Place 1 drop into the left eye in the morning and at bedtime      doxazosin (CARDURA) 4 MG tablet Take 1 tablet by mouth nightly      famotidine (PEPCID) 20 MG tablet Take 1 tablet by mouth 2 times daily      MAGNESIUM-OXIDE PO Take 400 mg by mouth in the morning and at bedtime      propranolol (INDERAL) 20 MG tablet Take 1 tablet by mouth 3 times daily      acetaminophen (TYLENOL) 325 MG tablet Take 1 tablet by mouth every 6 hours as needed for Fever      ondansetron (ZOFRAN) 4 MG tablet Take 1 tablet by mouth every 8 hours as needed for Vomiting or Nausea      oxyCODONE (ROXICODONE) 5 MG immediate release tablet Take 1 tablet by mouth every 8 hours as needed

## 2024-05-29 ENCOUNTER — TELEPHONE (OUTPATIENT)
Dept: GASTROENTEROLOGY | Age: 54
End: 2024-05-29

## 2024-05-29 ENCOUNTER — ANESTHESIA EVENT (OUTPATIENT)
Dept: ENDOSCOPY | Age: 54
End: 2024-05-29
Payer: COMMERCIAL

## 2024-05-29 NOTE — TELEPHONE ENCOUNTER
[facility] called stating this patient is scheduled for an Endoscopy tomorrow but they wanted to make sure we had the Workers Comp information. I did tell them it was already in the system from seeing someone within [facility] before but could not see in the referral information where the workers comp was GI related so we put W/C as primary but then entered in his [facility] as a secondary form of payment. She voiced understanding stating they were also trying to determine if it was GI related as well and if it would truly fall under workers comp so she said us putting in [facility] was a good thing because that way it could be a back up method of payment. I voiced understanding.

## 2024-05-30 ENCOUNTER — ANESTHESIA (OUTPATIENT)
Dept: ENDOSCOPY | Age: 54
End: 2024-05-30
Payer: COMMERCIAL

## 2024-05-30 ENCOUNTER — HOSPITAL ENCOUNTER (OUTPATIENT)
Age: 54
Setting detail: OUTPATIENT SURGERY
Discharge: NURSING, REHAB OR LONG-TERM CARE | End: 2024-05-30
Attending: INTERNAL MEDICINE | Admitting: INTERNAL MEDICINE
Payer: COMMERCIAL

## 2024-05-30 VITALS
HEIGHT: 72 IN | SYSTOLIC BLOOD PRESSURE: 165 MMHG | BODY MASS INDEX: 24.11 KG/M2 | OXYGEN SATURATION: 100 % | RESPIRATION RATE: 16 BRPM | TEMPERATURE: 97.4 F | DIASTOLIC BLOOD PRESSURE: 89 MMHG | WEIGHT: 178 LBS | HEART RATE: 72 BPM

## 2024-05-30 DIAGNOSIS — R11.2 NAUSEA AND VOMITING, UNSPECIFIED VOMITING TYPE: ICD-10-CM

## 2024-05-30 DIAGNOSIS — R13.10 DYSPHAGIA, UNSPECIFIED TYPE: ICD-10-CM

## 2024-05-30 LAB
GLUCOSE BLD-MCNC: 110 MG/DL (ref 70–99)
PERFORMED ON: ABNORMAL

## 2024-05-30 PROCEDURE — 7100000011 HC PHASE II RECOVERY - ADDTL 15 MIN: Performed by: INTERNAL MEDICINE

## 2024-05-30 PROCEDURE — 3700000001 HC ADD 15 MINUTES (ANESTHESIA): Performed by: INTERNAL MEDICINE

## 2024-05-30 PROCEDURE — 7100000010 HC PHASE II RECOVERY - FIRST 15 MIN: Performed by: INTERNAL MEDICINE

## 2024-05-30 PROCEDURE — 88305 TISSUE EXAM BY PATHOLOGIST: CPT

## 2024-05-30 PROCEDURE — 2580000003 HC RX 258: Performed by: INTERNAL MEDICINE

## 2024-05-30 PROCEDURE — 3609015300 HC ESOPHAGEAL DILATION MALONEY: Performed by: INTERNAL MEDICINE

## 2024-05-30 PROCEDURE — 43239 EGD BIOPSY SINGLE/MULTIPLE: CPT | Performed by: INTERNAL MEDICINE

## 2024-05-30 PROCEDURE — 43450 DILATE ESOPHAGUS 1/MULT PASS: CPT | Performed by: INTERNAL MEDICINE

## 2024-05-30 PROCEDURE — 6360000002 HC RX W HCPCS: Performed by: NURSE ANESTHETIST, CERTIFIED REGISTERED

## 2024-05-30 PROCEDURE — 3609012400 HC EGD TRANSORAL BIOPSY SINGLE/MULTIPLE: Performed by: INTERNAL MEDICINE

## 2024-05-30 PROCEDURE — 2500000003 HC RX 250 WO HCPCS: Performed by: NURSE ANESTHETIST, CERTIFIED REGISTERED

## 2024-05-30 PROCEDURE — 88342 IMHCHEM/IMCYTCHM 1ST ANTB: CPT

## 2024-05-30 PROCEDURE — 3700000000 HC ANESTHESIA ATTENDED CARE: Performed by: INTERNAL MEDICINE

## 2024-05-30 PROCEDURE — 2709999900 HC NON-CHARGEABLE SUPPLY: Performed by: INTERNAL MEDICINE

## 2024-05-30 PROCEDURE — 82962 GLUCOSE BLOOD TEST: CPT

## 2024-05-30 RX ORDER — HYDROMORPHONE HYDROCHLORIDE 1 MG/ML
0.5 INJECTION, SOLUTION INTRAMUSCULAR; INTRAVENOUS; SUBCUTANEOUS EVERY 5 MIN PRN
Status: CANCELLED | OUTPATIENT
Start: 2024-05-30

## 2024-05-30 RX ORDER — DIPHENHYDRAMINE HYDROCHLORIDE 50 MG/ML
12.5 INJECTION INTRAMUSCULAR; INTRAVENOUS
Status: CANCELLED | OUTPATIENT
Start: 2024-05-30 | End: 2024-05-31

## 2024-05-30 RX ORDER — NALOXONE HYDROCHLORIDE 0.4 MG/ML
INJECTION, SOLUTION INTRAMUSCULAR; INTRAVENOUS; SUBCUTANEOUS PRN
Status: CANCELLED | OUTPATIENT
Start: 2024-05-30

## 2024-05-30 RX ORDER — METOCLOPRAMIDE HYDROCHLORIDE 5 MG/ML
10 INJECTION INTRAMUSCULAR; INTRAVENOUS
Status: CANCELLED | OUTPATIENT
Start: 2024-05-30 | End: 2024-05-31

## 2024-05-30 RX ORDER — MEPERIDINE HYDROCHLORIDE 25 MG/ML
12.5 INJECTION INTRAMUSCULAR; INTRAVENOUS; SUBCUTANEOUS EVERY 5 MIN PRN
Status: CANCELLED | OUTPATIENT
Start: 2024-05-30

## 2024-05-30 RX ORDER — SODIUM CHLORIDE, SODIUM LACTATE, POTASSIUM CHLORIDE, CALCIUM CHLORIDE 600; 310; 30; 20 MG/100ML; MG/100ML; MG/100ML; MG/100ML
INJECTION, SOLUTION INTRAVENOUS CONTINUOUS
Status: DISCONTINUED | OUTPATIENT
Start: 2024-05-30 | End: 2024-05-30 | Stop reason: HOSPADM

## 2024-05-30 RX ORDER — SODIUM CHLORIDE 0.9 % (FLUSH) 0.9 %
5-40 SYRINGE (ML) INJECTION PRN
Status: CANCELLED | OUTPATIENT
Start: 2024-05-30

## 2024-05-30 RX ORDER — FENTANYL CITRATE 50 UG/ML
INJECTION, SOLUTION INTRAMUSCULAR; INTRAVENOUS PRN
Status: DISCONTINUED | OUTPATIENT
Start: 2024-05-30 | End: 2024-05-30 | Stop reason: SDUPTHER

## 2024-05-30 RX ORDER — LIDOCAINE HYDROCHLORIDE 10 MG/ML
INJECTION, SOLUTION EPIDURAL; INFILTRATION; INTRACAUDAL; PERINEURAL PRN
Status: DISCONTINUED | OUTPATIENT
Start: 2024-05-30 | End: 2024-05-30 | Stop reason: SDUPTHER

## 2024-05-30 RX ORDER — PROPOFOL 10 MG/ML
INJECTION, EMULSION INTRAVENOUS PRN
Status: DISCONTINUED | OUTPATIENT
Start: 2024-05-30 | End: 2024-05-30 | Stop reason: SDUPTHER

## 2024-05-30 RX ORDER — HYDROMORPHONE HYDROCHLORIDE 1 MG/ML
0.25 INJECTION, SOLUTION INTRAMUSCULAR; INTRAVENOUS; SUBCUTANEOUS EVERY 5 MIN PRN
Status: CANCELLED | OUTPATIENT
Start: 2024-05-30

## 2024-05-30 RX ORDER — SODIUM CHLORIDE 9 MG/ML
INJECTION, SOLUTION INTRAVENOUS PRN
Status: CANCELLED | OUTPATIENT
Start: 2024-05-30

## 2024-05-30 RX ORDER — SODIUM CHLORIDE 0.9 % (FLUSH) 0.9 %
5-40 SYRINGE (ML) INJECTION EVERY 12 HOURS SCHEDULED
Status: CANCELLED | OUTPATIENT
Start: 2024-05-30

## 2024-05-30 RX ADMIN — PROPOFOL 25 MG: 10 INJECTION, EMULSION INTRAVENOUS at 10:20

## 2024-05-30 RX ADMIN — FENTANYL CITRATE 50 MCG: 50 INJECTION INTRAMUSCULAR; INTRAVENOUS at 10:19

## 2024-05-30 RX ADMIN — FENTANYL CITRATE 50 MCG: 50 INJECTION INTRAMUSCULAR; INTRAVENOUS at 10:17

## 2024-05-30 RX ADMIN — PROPOFOL 70 MG: 10 INJECTION, EMULSION INTRAVENOUS at 10:15

## 2024-05-30 RX ADMIN — LIDOCAINE HYDROCHLORIDE 50 MG: 10 INJECTION, SOLUTION EPIDURAL; INFILTRATION; INTRACAUDAL; PERINEURAL at 10:15

## 2024-05-30 RX ADMIN — PROPOFOL 100 MG: 10 INJECTION, EMULSION INTRAVENOUS at 10:17

## 2024-05-30 RX ADMIN — SODIUM CHLORIDE, POTASSIUM CHLORIDE, SODIUM LACTATE AND CALCIUM CHLORIDE: 600; 310; 30; 20 INJECTION, SOLUTION INTRAVENOUS at 10:06

## 2024-05-30 ASSESSMENT — PAIN - FUNCTIONAL ASSESSMENT
PAIN_FUNCTIONAL_ASSESSMENT: 0-10
PAIN_FUNCTIONAL_ASSESSMENT: NONE - DENIES PAIN

## 2024-05-30 NOTE — ANESTHESIA PRE PROCEDURE
Department of Anesthesiology  Preprocedure Note       Name:  Franicsco Ennis   Age:  54 y.o.  :  1970                                          MRN:  [id number]         Date:  2024      Surgeon: Surgeon(s):  Becky Perdue MD    Procedure: Procedure(s):  ESOPHAGOGASTRODUODENOSCOPY BIOPSY    Medications prior to admission:   Prior to Admission medications    Medication Sig Start Date End Date Taking? Authorizing Provider   ALPRAZolam (XANAX) 0.25 MG tablet Take 1 tablet by mouth nightly as needed for Sleep. Max Daily Amount: 0.25 mg    Adryan Alejandro MD   omeprazole (PRILOSEC) 40 MG delayed release capsule Take 1 capsule by mouth daily Take first thing daily on an empty stomach. 24   Renate Washington APRN - NP   amantadine (SYMMETREL) 100 MG capsule Take 1 capsule by mouth 2 times daily 3/19/24   Adryan Alejandro MD   atorvastatin (LIPITOR) 40 MG tablet Take 1 tablet by mouth daily    Adryan Alejandro MD   brimonidine (ALPHAGAN) 0.2 % ophthalmic solution 1 drop 3 times daily    Adryan Alejandro MD   buprenorphine (BUPRENEX) 10 MCG/HR PTWK Place 1 patch onto the skin once a week.    Adryan Alejandro MD   dorzolamide (TRUSOPT) 2 % ophthalmic solution Place 1 drop into the left eye in the morning and at bedtime    Adryan Alejandro MD   doxazosin (CARDURA) 4 MG tablet Take 1 tablet by mouth nightly    Adryan Alejandro MD   famotidine (PEPCID) 20 MG tablet Take 1 tablet by mouth 2 times daily    Adryan Alejandro MD   MAGNESIUM-OXIDE PO Take 400 mg by mouth in the morning and at bedtime    Adryan Alejandro MD   propranolol (INDERAL) 20 MG tablet Take 1 tablet by mouth 3 times daily    Adryan Alejandro MD   acetaminophen (TYLENOL) 325 MG tablet Take 1 tablet by mouth every 6 hours as needed for Fever 12/15/23   Adryan Alejandro MD   ondansetron (ZOFRAN) 4 MG tablet Take 1 tablet by mouth every 8 hours as needed for Vomiting or Nausea

## 2024-05-30 NOTE — H&P
Patient Name: Francisco Ennis  : 1970  MRN: [id number]  DATE: 24    Allergies: No Known Allergies     ENDOSCOPY  History and Physical    Procedure:    [] Diagnostic Colonoscopy       [] Screening Colonoscopy  [x] EGD      [] ERCP      [] EUS       [] Other    [x] Previous office notes/History and Physical reviewed from the patients chart. Please see EMR for further details of HPI. I have examined the patient's status immediately prior to the procedure and:      Indications/HPI:        1. Nausea and vomiting, unspecified vomiting type    2. Dysphagia, unspecified type      []Abdominal Pain   []Cancer- GI/Lung     []Fhx of colon CA/polyps  []History of Polyps  []Barretts            []Melena  []Abnormal Imaging              []Dysphagia              []Persistent Pneumonia   []Anemia                            []Food Impaction        []History of Polyps  [] GI Bleed             []Pulmonary nodule/Mass   []Change in bowel habits []Heartburn/Reflux  []Rectal Bleed (BRBPR)  []Chest Pain - Non Cardiac []Heme (+) Stool []Ulcers  []Constipation  []Hemoptysis  []Varices  []Diarrhea  []Hypoxemia    []Nausea/Vomiting   []Screening   []Crohns/Colitis  []Other:     Anesthesia:   [x] MAC [] Moderate Sedation   [] General   [] None     ROS: 12 pt Review of Symptoms was negative unless mentioned above    Medications:   Prior to Admission medications    Medication Sig Start Date End Date Taking? Authorizing Provider   ALPRAZolam (XANAX) 0.25 MG tablet Take 1 tablet by mouth nightly as needed for Sleep.    ProviderAdryan MD   omeprazole (PRILOSEC) 40 MG delayed release capsule Take 1 capsule by mouth daily Take first thing daily on an empty stomach. 24   Renate Washington, APRN - NP   amantadine (SYMMETREL) 100 MG capsule Take 1 capsule by mouth 2 times daily 3/19/24   ProviderAdryan MD   atorvastatin (LIPITOR) 40 MG tablet Take 1 tablet by mouth daily    ProviderAdryan MD   brimonidine

## 2024-05-30 NOTE — ANESTHESIA POSTPROCEDURE EVALUATION
Department of Anesthesiology  Postprocedure Note    Patient: Francisco Ennis  MRN: [id number]  YOB: 1970  Date of evaluation: 5/30/2024    Procedure Summary       Date: 05/30/24 Room / Location: [place] [address] / [facility]    Anesthesia Start: 1010 Anesthesia Stop:     Procedures:       ESOPHAGOGASTRODUODENOSCOPY BIOPSY (Abdomen)      ESOPHAGEAL DILATION NATALIE Diagnosis:       Nausea and vomiting, unspecified vomiting type      Dysphagia, unspecified type      (Nausea and vomiting, unspecified vomiting type [R11.2])      (Dysphagia, unspecified type [R13.10])    Surgeons: Becky Perdue MD Responsible Provider: Chuck Olivarez APRN - CRNA    Anesthesia Type: MAC ASA Status: 2            Anesthesia Type: No value filed.    Beverly Phase I: Beverly Score: 10    Beverly Phase II:      Anesthesia Post Evaluation    Patient location during evaluation: bedside  Patient participation: waiting for patient participation  Level of consciousness: responsive to light touch  Pain score: 0  Airway patency: patent  Nausea & Vomiting: no vomiting and no nausea  Cardiovascular status: blood pressure returned to baseline  Respiratory status: acceptable  Hydration status: stable  Pain management: adequate        No notable events documented.

## 2024-05-30 NOTE — OP NOTE
Endoscopic Procedure Note    Patient: Francisco Ennis : 1970  Med Rec#: [id number] Acc#: [id number]     Primary Care Provider Catrachito Orourke MD    Endoscopist: Becky Perdue MD, MD    Date of Procedure:  2024    Procedure:   EGD with    Corral bougie dilation of esophagus and  Cold biopsies    Indications:   1. Nausea and vomiting, unspecified vomiting type    2. Dysphagia, unspecified type    3.  History of tracheostomy    Anesthesia:  Sedation was administered by anesthesia who monitored the patient during the procedure.    Estimated Blood Loss: minimal    Procedure:   After reviewing the patient's chart and obtaining informed consent, the patient was placed in the left lateral decubitus position.  A forward-viewing Olympus endoscope was lubricated and inserted through the mouth into the oropharynx. Under direct visualization, the upper esophagus was intubated. The scope was advanced to the level of the third portion of duodenum. Scope was slowly withdrawn with careful inspection of the mucosal surfaces. The scope was retroflexed for inspection of the gastric fundus and incisura. Findings and maneuvers are listed in impression below.     Next, a lubricated Corral weighted Bougie dilator-54 Fr was gently introduced into the patient's mouth and passed into the Esophagus and into the proximal stomach without much resistance and then withdrawn. Repeat EGD was performed to verify dilation and scope tip was passed into the stomach. NO evidence of perforation or excessive bleeding was noted subsequent to the dilation.        The patient tolerated the procedure well. The scope was removed. There were no immediate complications.    Findings/IMPRESSION:  Esophagus: normal and a normal EG junction at 42 cm.    NO erosions or ulcers or nodules or strictures or webs or rings or mass lesions or extrinsic compression or diverticula noted. An empirical Corral 54 fr bougie dilation was performed 
WDL

## 2024-05-30 NOTE — DISCHARGE INSTRUCTIONS
1.  Await path results, the patient will be contacted in 7-10 days with biopsy results.   2.  Magic mouthwash 5 ml PO Swish and swallow q3h PRN ONLY IF patient has post-procedural sorethroat or chest pain.  3. Full liquids to soft diet today patti discharge from the surgicenter; may advance diet starting in AM tomorrow.  4. May resume other meds except any ASA/NSAIDs; may use cough drops or lozenges PRN; also continue meds for GERD with anti-GERD measures.  5. NO ASA/NSAIDs x 2 weeks  6. OP f/u in 6-8 weeks with  ; will consider an Esophageal manometry later if the patient's dysphagia persists.     Upper GI Endoscopy: What to Expect at Home  Your Recovery  You had an upper GI endoscopy. Your doctor used a thin, lighted tube that bends to look at the inside of your esophagus, your stomach, and the first part of the small intestine, called the duodenum.    How can you care for yourself at home?  Activity   Rest as much as you need to after you go home.  You should be able to go back to your usual activities the day after the test.  Due to anesthesia, no driving or operating equipment for 24 hours.  Diet   Follow your doctor's directions for eating after the test.  Drink plenty of fluids (unless your doctor has told you not to).  Medications   If you have a sore throat the day after the test, use an over-the-counter spray to numb your throat.  When should you call for help?   Call 911 anytime you think you may need emergency care. For example, call if:    You passed out (lost consciousness).     You have trouble breathing.     You pass maroon or bloody stools.   Call your doctor now or seek immediate medical care if:    You have pain that does not get better after your take pain medicine.     You have new or worse belly pain.     You have blood in your stools.     You are sick to your stomach and cannot keep fluids down.     You have a fever.     You cannot pass stools or gas.   Watch closely for changes in

## 2024-06-03 ENCOUNTER — TRANSCRIBE ORDERS (OUTPATIENT)
Dept: ADMINISTRATIVE | Facility: HOSPITAL | Age: 54
End: 2024-06-03
Payer: COMMERCIAL

## 2024-06-03 DIAGNOSIS — N18.2 CHRONIC RENAL DISEASE, STAGE II: Primary | ICD-10-CM

## 2024-06-10 ENCOUNTER — HOSPITAL ENCOUNTER (OUTPATIENT)
Dept: ULTRASOUND IMAGING | Facility: HOSPITAL | Age: 54
Discharge: HOME | End: 2024-06-10
Admitting: INTERNAL MEDICINE
Payer: COMMERCIAL

## 2024-06-10 PROCEDURE — 76775 US EXAM ABDO BACK WALL LIM: CPT

## 2024-07-02 ENCOUNTER — OFFICE VISIT (OUTPATIENT)
Dept: GASTROENTEROLOGY | Age: 54
End: 2024-07-02

## 2024-07-02 VITALS
BODY MASS INDEX: 24.11 KG/M2 | DIASTOLIC BLOOD PRESSURE: 80 MMHG | SYSTOLIC BLOOD PRESSURE: 130 MMHG | OXYGEN SATURATION: 98 % | HEART RATE: 68 BPM | HEIGHT: 72 IN | WEIGHT: 178 LBS

## 2024-07-02 DIAGNOSIS — K21.00 GASTROESOPHAGEAL REFLUX DISEASE WITH ESOPHAGITIS WITHOUT HEMORRHAGE: Primary | ICD-10-CM

## 2024-07-02 DIAGNOSIS — K29.50 MILD CHRONIC GASTRITIS: ICD-10-CM

## 2024-07-02 ASSESSMENT — ENCOUNTER SYMPTOMS
ABDOMINAL DISTENTION: 0
DIARRHEA: 0
TROUBLE SWALLOWING: 0
RECTAL PAIN: 0
BLOOD IN STOOL: 0
ABDOMINAL PAIN: 0
CONSTIPATION: 0
CHOKING: 0
COUGH: 0
NAUSEA: 0
SHORTNESS OF BREATH: 0
VOMITING: 0
ANAL BLEEDING: 0
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
## 2024-07-03 ENCOUNTER — APPOINTMENT (OUTPATIENT)
Dept: CT IMAGING | Facility: HOSPITAL | Age: 54
End: 2024-07-03
Payer: COMMERCIAL

## 2024-07-03 ENCOUNTER — HOSPITAL ENCOUNTER (EMERGENCY)
Facility: HOSPITAL | Age: 54
Discharge: HOME | End: 2024-07-03
Attending: STUDENT IN AN ORGANIZED HEALTH CARE EDUCATION/TRAINING PROGRAM
Payer: COMMERCIAL

## 2024-07-03 VITALS
OXYGEN SATURATION: 99 % | DIASTOLIC BLOOD PRESSURE: 76 MMHG | WEIGHT: 189 LBS | HEART RATE: 73 BPM | RESPIRATION RATE: 18 BRPM | BODY MASS INDEX: 25.6 KG/M2 | SYSTOLIC BLOOD PRESSURE: 152 MMHG | TEMPERATURE: 98 F | HEIGHT: 72 IN

## 2024-07-03 DIAGNOSIS — S09.90XA INJURY OF HEAD, INITIAL ENCOUNTER: Primary | ICD-10-CM

## 2024-07-03 DIAGNOSIS — S01.91XA LACERATION OF HEAD WITHOUT FOREIGN BODY, UNSPECIFIED PART OF HEAD, INITIAL ENCOUNTER: ICD-10-CM

## 2024-07-03 PROCEDURE — 70450 CT HEAD/BRAIN W/O DYE: CPT

## 2024-07-03 PROCEDURE — 90715 TDAP VACCINE 7 YRS/> IM: CPT | Performed by: STUDENT IN AN ORGANIZED HEALTH CARE EDUCATION/TRAINING PROGRAM

## 2024-07-03 PROCEDURE — 25010000002 TETANUS-DIPHTH-ACELL PERTUSSIS 5-2.5-18.5 LF-MCG/0.5 SUSPENSION PREFILLED SYRINGE: Performed by: STUDENT IN AN ORGANIZED HEALTH CARE EDUCATION/TRAINING PROGRAM

## 2024-07-03 PROCEDURE — 90471 IMMUNIZATION ADMIN: CPT | Performed by: STUDENT IN AN ORGANIZED HEALTH CARE EDUCATION/TRAINING PROGRAM

## 2024-07-03 PROCEDURE — 99284 EMERGENCY DEPT VISIT MOD MDM: CPT

## 2024-07-03 PROCEDURE — 72125 CT NECK SPINE W/O DYE: CPT

## 2024-07-03 RX ADMIN — Medication 3 ML: at 01:44

## 2024-07-03 RX ADMIN — TETANUS TOXOID, REDUCED DIPHTHERIA TOXOID AND ACELLULAR PERTUSSIS VACCINE, ADSORBED 0.5 ML: 5; 2.5; 8; 8; 2.5 SUSPENSION INTRAMUSCULAR at 01:43

## 2024-07-03 NOTE — DISCHARGE INSTRUCTIONS
Please come back for signs of confusion, severe headache, or any new emergencies.  You have sutures that do not need to be removed and will absorb on their own.  Come back if there are any signs of wound infection like redness swelling or drainage
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
## 2024-08-01 ENCOUNTER — HOSPITAL ENCOUNTER (OUTPATIENT)
Dept: GENERAL RADIOLOGY | Age: 54
Discharge: HOME | End: 2024-08-01
Payer: COMMERCIAL

## 2024-08-01 ENCOUNTER — HOSPITAL ENCOUNTER (OUTPATIENT)
Dept: CT IMAGING | Age: 54
Discharge: HOME | End: 2024-08-01
Payer: COMMERCIAL

## 2024-08-01 DIAGNOSIS — S46.011S STRAIN OF TENDON OF RIGHT ROTATOR CUFF, SEQUELA: ICD-10-CM

## 2024-08-01 DIAGNOSIS — S46.001A INJURY OF TENDON OF RIGHT ROTATOR CUFF, INITIAL ENCOUNTER: ICD-10-CM

## 2024-08-01 PROCEDURE — 73201 CT UPPER EXTREMITY W/DYE: CPT

## 2024-08-01 PROCEDURE — 73040 CONTRAST X-RAY OF SHOULDER: CPT

## 2024-08-01 PROCEDURE — 6360000004 HC RX CONTRAST MEDICATION: Performed by: ORTHOPAEDIC SURGERY

## 2024-08-01 RX ORDER — IOPAMIDOL 408 MG/ML
10 INJECTION, SOLUTION INTRATHECAL
Status: COMPLETED | OUTPATIENT
Start: 2024-08-01 | End: 2024-08-01

## 2024-08-01 RX ADMIN — IOPAMIDOL 10 ML: 408 INJECTION, SOLUTION INTRATHECAL at 12:09

## 2024-10-23 NOTE — PROGRESS NOTES
RCR, SAD performed by Calvin Mo MD at [facility] OR    UPPER GASTROINTESTINAL ENDOSCOPY N/A 05/30/2024    Dr Perdue, (-) h pylori, (-) EOE, (+) GERD, W 54 fr dil, sugg chemical gastritis       Current Medications:   Prior to Admission medications    Medication Sig Start Date End Date Taking? Authorizing Provider   vitamin D (ERGOCALCIFEROL) 1.25 MG (86975 UT) CAPS capsule    Yes Adryan Alejandro MD   amantadine (SYMMETREL) 100 MG capsule Take 1 capsule by mouth 2 times daily 3/19/24  Yes Adryan Alejandro MD   atorvastatin (LIPITOR) 40 MG tablet Take 1 tablet by mouth daily   Yes ProviderAdryan MD   brimonidine (ALPHAGAN) 0.2 % ophthalmic solution 1 drop 3 times daily   Yes Adryan Alejandro MD   dorzolamide (TRUSOPT) 2 % ophthalmic solution Place 1 drop into the left eye in the morning and at bedtime   Yes Adryan Alejandro MD   doxazosin (CARDURA) 4 MG tablet Take 1 tablet by mouth nightly   Yes Provider, MD Adryan   famotidine (PEPCID) 20 MG tablet Take 1 tablet by mouth 2 times daily   Yes Provider, MD Adryan   MAGNESIUM-OXIDE PO Take 400 mg by mouth in the morning and at bedtime   Yes Provider, MD Adryan   propranolol (INDERAL) 20 MG tablet Take 1 tablet by mouth 3 times daily   Yes ProviderAdryan MD   acetaminophen (TYLENOL) 325 MG tablet Take 1 tablet by mouth every 6 hours as needed for Fever 12/15/23  Yes Adryan Alejandro MD   amLODIPine (NORVASC) 10 MG tablet Take 1 tablet by mouth daily 4/22/23  Yes Provider, MD Adryan   metFORMIN (GLUCOPHAGE) 1000 MG tablet Take 1 tablet by mouth 2 times daily (with meals) 4/24/23  Yes ProviderAdryan MD   empagliflozin (JARDIANCE) 25 MG tablet Take 1 tablet by mouth daily 11/1/22  Yes Provider, MD Adryan       Allergies:  Patient has no known allergies.    System Neg/Pos Details   Constitutional Negative Fatigue and Fever.   Respiratory Negative Cough and Dyspnea.   Cardio Negative Chest pain.   GI

## 2024-10-24 ENCOUNTER — OFFICE VISIT (OUTPATIENT)
Age: 54
End: 2024-10-24
Payer: COMMERCIAL

## 2024-10-24 VITALS — WEIGHT: 208 LBS | HEIGHT: 72 IN | BODY MASS INDEX: 28.17 KG/M2

## 2024-10-24 DIAGNOSIS — Z98.890 HISTORY OF ROTATOR CUFF SURGERY: ICD-10-CM

## 2024-10-24 DIAGNOSIS — S46.011D TRAUMATIC INCOMPLETE TEAR OF RIGHT ROTATOR CUFF, SUBSEQUENT ENCOUNTER: Primary | ICD-10-CM

## 2024-10-24 PROBLEM — S46.011A TRAUMATIC INCOMPLETE TEAR OF RIGHT ROTATOR CUFF: Status: ACTIVE | Noted: 2024-10-24

## 2024-10-24 PROCEDURE — 3017F COLORECTAL CA SCREEN DOC REV: CPT

## 2024-10-24 PROCEDURE — 1036F TOBACCO NON-USER: CPT

## 2024-10-24 PROCEDURE — G8419 CALC BMI OUT NRM PARAM NOF/U: HCPCS

## 2024-10-24 PROCEDURE — 99213 OFFICE O/P EST LOW 20 MIN: CPT

## 2024-10-24 PROCEDURE — G8484 FLU IMMUNIZE NO ADMIN: HCPCS

## 2024-10-24 PROCEDURE — G8427 DOCREV CUR MEDS BY ELIG CLIN: HCPCS

## 2024-10-24 RX ORDER — ERGOCALCIFEROL 1.25 MG/1
CAPSULE, LIQUID FILLED ORAL
COMMUNITY

## 2024-12-30 ENCOUNTER — OFFICE VISIT (OUTPATIENT)
Dept: NEUROLOGY | Age: 54
End: 2024-12-30
Payer: COMMERCIAL

## 2024-12-30 VITALS
SYSTOLIC BLOOD PRESSURE: 127 MMHG | HEART RATE: 68 BPM | DIASTOLIC BLOOD PRESSURE: 79 MMHG | HEIGHT: 71 IN | WEIGHT: 229 LBS | BODY MASS INDEX: 32.06 KG/M2

## 2024-12-30 DIAGNOSIS — G47.33 OBSTRUCTIVE SLEEP APNEA: Primary | ICD-10-CM

## 2024-12-30 DIAGNOSIS — Z87.820 HISTORY OF TRAUMATIC BRAIN INJURY: ICD-10-CM

## 2024-12-30 DIAGNOSIS — R06.83 SNORING: ICD-10-CM

## 2024-12-30 DIAGNOSIS — R06.81 WITNESSED APNEIC SPELLS: ICD-10-CM

## 2024-12-30 PROCEDURE — 99214 OFFICE O/P EST MOD 30 MIN: CPT | Performed by: PHYSICIAN ASSISTANT

## 2024-12-30 RX ORDER — ESCITALOPRAM OXALATE 10 MG/1
10 TABLET ORAL DAILY
COMMUNITY
Start: 2024-12-19

## 2024-12-30 RX ORDER — OMEPRAZOLE 40 MG/1
40 CAPSULE, DELAYED RELEASE ORAL DAILY
COMMUNITY
Start: 2024-10-09

## 2024-12-30 NOTE — PATIENT INSTRUCTIONS
Patient education: Sleep apnea in adults       INTRODUCTION -- Normally during sleep, air moves through the throat and in and out of the lungs at a regular rhythm. In a person with sleep apnea, air movement is periodically diminished or stopped. There are two types of sleep apnea: obstructive sleep apnea and central sleep apnea. In obstructive sleep apnea, breathing is abnormal because of narrowing or closure of the throat. In central sleep apnea, breathing is abnormal because of a change in the breathing control and rhythm.  Sleep apnea is a serious condition that can affect a person's ability to safely perform normal daily activities and can affect long term health. Approximately 25 percent of adults are at risk for sleep apnea of some degree.  Men are more commonly affected than women. Other risk factors include middle and older age, being overweight or obese, and having a small mouth and throat.  This topic review focuses on the most common type of sleep apnea in adults, obstructive sleep apnea (SUSAN).    HOW SLEEP APNEA OCCURS -- The throat is surrounded by muscles that control the airway for speaking, swallowing, and breathing. During sleep, these muscles are less active, and this causes the throat to narrow.  In most people, this narrowing does not affect breathing. In others, it can cause snoring, sometimes with reduced or completely blocked airflow.  A completely blocked airway without airflow is called an obstructive apnea. Partial obstruction with diminished airflow is called a hypopnea. A person may have apnea and hypopnea during sleep.  Insufficient breathing due to apnea or hypopnea causes oxygen levels to fall and carbon dioxide to rise. Because the airway is blocked, breathing faster or harder does not help to improve oxygen levels until the airway is reopened. Typically, the obstruction requires the person to awaken to activate the upper airway muscles. Once the airway is opened, the person then

## 2024-12-30 NOTE — PROGRESS NOTES
REVIEW OF SYSTEMS    Constitutional: []Fever []Sweat []Chills [] Recent Injury [x] Denies all unless marked  HEENT:[]Headache  [] Head Injury/Hearing Loss  [] Sore Throat  [] Ear Ache/Dizziness  [x] Denies all unless marked  Spine:  [] Neck pain  [] Back pain  [] Sciaticia  [x] Denies all unless marked  Cardiovascular:[]Heart Disease []Chest Pain [] Palpitations  [x] Denies all unless marked  Pulmonary: []Shortness of Breath []Cough   [x] Denies all unless marke  Gastrointestinal: []Nausea  []Vomiting  []Abdominal Pain  []Constipation  []Diarrhea  []Dark Bloody Stools  [x] Denies all unless marked  Psychiatric/Behavioral:[] Depression [] Anxiety [x] Denies all unless marked  Genitourinary:   [] Frequency  [] Urgency  [] Incontinence [] Pain with Urination  [x] Denies all unless marked  Extremities: []Pain  []Swelling  [x] Denies all unless marked  Musculoskeletal: [] Muscle Pain  [] Joint Pain  [] Arthritis [] Muscle Cramps [] Muscle Twitches  [x] Denies all unless marked  Sleep: [] Insomnia [] Snoring [] Restless Legs [x] Sleep Apnea  [] Daytime Sleepiness  [] Denies all unless marked  Skin:[] Rash [] Skin Discoloration [x] Denies all unless marked   Neurological: []Visual Disturbance/Memory Loss [] Loss of Balance [] Slurred Speech/Weakness [] Seizures  [] Vertigo/Dizziness [x] Denies all unless marked      
ICD-10-CM    1. Obstructive sleep apnea  G47.33 Baseline Diagnostic Sleep Study     [facility]      2. Snoring  R06.83 Baseline Diagnostic Sleep Study     [facility]      3. Witnessed apneic spells  R06.81 Baseline Diagnostic Sleep Study     [facility]      4. History of traumatic brain injury  Z87.820 Baseline Diagnostic Sleep Study     [facility]             The patient presents with several risk factors and symptoms suggestive of sleep-disordered breathing, including obesity (BMI 31.94), increased neck circumference (16.5 inches), Mallampati score of 4, snoring, and witnessed apneas. The history of TBI raises the possibility of CSA, while the physical characteristics are more consistent with SUSAN. The distinction between SUSAN and CSA cannot be made definitively without a sleep study.  I recommended referral for a PSG to diagnose and differentiate between SSUAN and CSA.     PLAN:  1.    Orders Placed This Encounter   Procedures    [facility]    Baseline Diagnostic Sleep Study     No orders of the defined types were placed in this encounter.    2. Patient advised of the etiology,  pathophysiology, signs, symptoms, diagnosis, treatment options, and risks of untreated SUSAN. Risks may include, but are not limited to  hypertension, coronary artery disease, atrial fibrillation, CHF, diabetes, stroke, weight gain, impaired cognition, daytime somnolence,  and motor vehicle accidents. Advised to abstain from driving or operating heavy machinery when drowsy and the use of respiratory suppressants.   3.  The following educational material has been included in this visit after visit summary for your review:  SUSAN/PAP guidelines/sleep studies/CPAP-discussed with pt.  4.  Order-PSG   5.  If pt requires a PAP device he will be required to be evaluated for clinical benefit and  compliance during a 30 day period within the preceding 90 days of set up.  6.

## 2025-01-13 ENCOUNTER — TRANSCRIBE ORDERS (OUTPATIENT)
Dept: ADMINISTRATIVE | Facility: HOSPITAL | Age: 55
End: 2025-01-13
Payer: COMMERCIAL

## 2025-01-13 ENCOUNTER — OFFICE VISIT (OUTPATIENT)
Dept: WOUND CARE | Facility: HOSPITAL | Age: 55
End: 2025-01-13
Payer: OTHER MISCELLANEOUS

## 2025-01-13 ENCOUNTER — HOSPITAL ENCOUNTER (OUTPATIENT)
Dept: GENERAL RADIOLOGY | Facility: HOSPITAL | Age: 55
Discharge: HOME | End: 2025-01-13
Payer: OTHER MISCELLANEOUS

## 2025-01-13 ENCOUNTER — LAB REQUISITION (OUTPATIENT)
Dept: LAB | Facility: HOSPITAL | Age: 55
End: 2025-01-13
Payer: OTHER MISCELLANEOUS

## 2025-01-13 DIAGNOSIS — E11.621 TYPE 2 DIABETES MELLITUS WITH FOOT ULCER (CODE): ICD-10-CM

## 2025-01-13 DIAGNOSIS — M86.172 OTHER ACUTE OSTEOMYELITIS OF LEFT FOOT: Primary | ICD-10-CM

## 2025-01-13 PROCEDURE — 73660 X-RAY EXAM OF TOE(S): CPT

## 2025-01-13 PROCEDURE — 87070 CULTURE OTHR SPECIMN AEROBIC: CPT

## 2025-01-13 PROCEDURE — 87147 CULTURE TYPE IMMUNOLOGIC: CPT

## 2025-01-13 PROCEDURE — 87176 TISSUE HOMOGENIZATION CULTR: CPT

## 2025-01-13 PROCEDURE — 87205 SMEAR GRAM STAIN: CPT

## 2025-01-13 PROCEDURE — 87186 SC STD MICRODIL/AGAR DIL: CPT

## 2025-01-13 PROCEDURE — 87075 CULTR BACTERIA EXCEPT BLOOD: CPT

## 2025-01-13 PROCEDURE — G0463 HOSPITAL OUTPT CLINIC VISIT: HCPCS

## 2025-01-16 ENCOUNTER — PATIENT ROUNDING (BHMG ONLY) (OUTPATIENT)
Age: 55
End: 2025-01-16
Payer: COMMERCIAL

## 2025-01-16 ENCOUNTER — OFFICE VISIT (OUTPATIENT)
Age: 55
End: 2025-01-16
Payer: OTHER MISCELLANEOUS

## 2025-01-16 VITALS
SYSTOLIC BLOOD PRESSURE: 118 MMHG | HEART RATE: 67 BPM | HEIGHT: 72 IN | BODY MASS INDEX: 25.6 KG/M2 | WEIGHT: 189 LBS | DIASTOLIC BLOOD PRESSURE: 80 MMHG | OXYGEN SATURATION: 98 %

## 2025-01-16 DIAGNOSIS — R26.9 GAIT ABNORMALITY: ICD-10-CM

## 2025-01-16 DIAGNOSIS — E11.42 TYPE 2 DIABETES MELLITUS WITH DIABETIC POLYNEUROPATHY, WITHOUT LONG-TERM CURRENT USE OF INSULIN: ICD-10-CM

## 2025-01-16 DIAGNOSIS — E11.9 ENCOUNTER FOR DIABETIC FOOT EXAM: ICD-10-CM

## 2025-01-16 DIAGNOSIS — L60.2 ONYCHOGRYPHOSIS: Primary | ICD-10-CM

## 2025-01-16 DIAGNOSIS — S06.9X9S TRAUMATIC BRAIN INJURY WITH LOSS OF CONSCIOUSNESS, SEQUELA: ICD-10-CM

## 2025-01-16 PROBLEM — N40.0 BENIGN PROSTATIC HYPERPLASIA WITHOUT URINARY OBSTRUCTION: Status: ACTIVE | Noted: 2024-09-24

## 2025-01-16 PROBLEM — S06.6X9A: Status: ACTIVE | Noted: 2023-08-23

## 2025-01-16 PROBLEM — T14.8XXA FRICTION INJURY TO SKIN: Status: ACTIVE | Noted: 2023-08-23

## 2025-01-16 PROBLEM — S37.011A CONTUSION OF RIGHT KIDNEY: Status: ACTIVE | Noted: 2023-08-23

## 2025-01-16 PROBLEM — S37.009A KIDNEY INJURY: Status: ACTIVE | Noted: 2023-08-24

## 2025-01-16 PROBLEM — S32.402A FRACTURE OF LEFT ACETABULUM: Status: ACTIVE | Noted: 2023-08-23

## 2025-01-16 PROBLEM — S22.069A: Status: ACTIVE | Noted: 2023-08-23

## 2025-01-16 PROBLEM — D45 POLYCYTHEMIA VERA: Status: ACTIVE | Noted: 2022-12-13

## 2025-01-16 PROBLEM — E78.2 MIXED HYPERLIPIDEMIA: Status: ACTIVE | Noted: 2024-04-14

## 2025-01-16 PROBLEM — R06.83 SNORING: Status: ACTIVE | Noted: 2024-12-30

## 2025-01-16 PROBLEM — K21.9 GASTROESOPHAGEAL REFLUX DISEASE: Status: ACTIVE | Noted: 2024-09-24

## 2025-01-16 PROBLEM — G89.21 CHRONIC PAIN DUE TO INJURY: Status: ACTIVE | Noted: 2024-03-28

## 2025-01-16 PROBLEM — J15.5: Status: ACTIVE | Noted: 2023-09-04
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
## 2025-01-16 PROBLEM — T14.8XXA DEEP TISSUE INJURY: Status: ACTIVE | Noted: 2023-08-29

## 2025-01-16 PROBLEM — J96.01 ACUTE HYPOXEMIC RESPIRATORY FAILURE: Status: ACTIVE | Noted: 2023-08-23

## 2025-01-16 PROBLEM — J15.69 PNEUMONIA DUE TO ENTEROBACTER CLOACAE: Status: ACTIVE | Noted: 2023-09-04

## 2025-01-16 PROBLEM — S02.92XA: Status: ACTIVE | Noted: 2023-08-23

## 2025-01-16 PROBLEM — I10 ESSENTIAL HYPERTENSION: Status: ACTIVE | Noted: 2024-03-28

## 2025-01-16 PROBLEM — S06.300A: Status: ACTIVE | Noted: 2023-08-23

## 2025-01-16 PROBLEM — R06.81 WITNESSED APNEIC SPELLS: Status: ACTIVE | Noted: 2024-12-30

## 2025-01-16 PROBLEM — Z87.820 HISTORY OF TRAUMATIC BRAIN INJURY: Status: ACTIVE | Noted: 2024-09-24

## 2025-01-16 PROBLEM — S46.011A TRAUMATIC INCOMPLETE TEAR OF RIGHT ROTATOR CUFF: Status: ACTIVE | Noted: 2024-10-24

## 2025-01-16 PROBLEM — R25.1 TREMOR: Status: ACTIVE | Noted: 2024-04-14

## 2025-01-16 PROBLEM — S22.43XA FRACTURE OF MULTIPLE RIBS OF BOTH SIDES: Status: ACTIVE | Noted: 2023-08-23

## 2025-01-16 PROBLEM — S42.001A FRACTURE OF RIGHT CLAVICLE: Status: ACTIVE | Noted: 2023-08-23

## 2025-01-16 PROBLEM — J18.9 PNEUMONIA: Status: ACTIVE | Noted: 2023-09-02

## 2025-01-16 PROBLEM — Z98.890 HISTORY OF ROTATOR CUFF SURGERY: Status: ACTIVE | Noted: 2024-10-24

## 2025-01-16 PROBLEM — R73.9 HYPERGLYCEMIA: Status: ACTIVE | Noted: 2023-08-23

## 2025-01-16 PROBLEM — S12.600A: Status: ACTIVE | Noted: 2023-08-23

## 2025-01-16 LAB
BACTERIA SPEC AEROBE CULT: ABNORMAL
GRAM STN SPEC: ABNORMAL
GRAM STN SPEC: ABNORMAL

## 2025-01-16 PROCEDURE — 87102 FUNGUS ISOLATION CULTURE: CPT | Performed by: NURSE PRACTITIONER

## 2025-01-16 RX ORDER — FAMOTIDINE 20 MG/1
20 TABLET, FILM COATED ORAL 2 TIMES DAILY
COMMUNITY

## 2025-01-16 RX ORDER — AMANTADINE HYDROCHLORIDE 100 MG/1
100 CAPSULE, GELATIN COATED ORAL EVERY 12 HOURS SCHEDULED
COMMUNITY

## 2025-01-16 RX ORDER — TRAZODONE HYDROCHLORIDE 50 MG/1
1 TABLET, FILM COATED ORAL
COMMUNITY

## 2025-01-16 RX ORDER — ERGOCALCIFEROL 1.25 MG/1
50000 CAPSULE, LIQUID FILLED ORAL 2 TIMES WEEKLY
COMMUNITY

## 2025-01-16 RX ORDER — DOXAZOSIN 4 MG/1
1 TABLET ORAL DAILY
COMMUNITY

## 2025-01-16 RX ORDER — ESCITALOPRAM OXALATE 10 MG/1
1 TABLET ORAL DAILY
COMMUNITY
Start: 2024-12-18

## 2025-01-16 RX ORDER — CEFDINIR 300 MG/1
1 CAPSULE ORAL EVERY 12 HOURS SCHEDULED
COMMUNITY
Start: 2025-01-13

## 2025-01-16 RX ORDER — PROPRANOLOL HCL 20 MG
20 TABLET ORAL EVERY 8 HOURS SCHEDULED
COMMUNITY

## 2025-01-16 RX ORDER — LANOLIN ALCOHOL/MO/W.PET/CERES
1 CREAM (GRAM) TOPICAL 2 TIMES DAILY
COMMUNITY

## 2025-01-16 RX ORDER — ATORVASTATIN CALCIUM 40 MG/1
1 TABLET, FILM COATED ORAL DAILY
COMMUNITY

## 2025-01-16 RX ORDER — EMPAGLIFLOZIN 25 MG/1
1 TABLET, FILM COATED ORAL DAILY
COMMUNITY

## 2025-01-16 RX ORDER — OMEPRAZOLE 40 MG/1
1 CAPSULE, DELAYED RELEASE ORAL DAILY
COMMUNITY
Start: 2024-10-09

## 2025-01-16 RX ORDER — DOXYCYCLINE 100 MG/1
CAPSULE ORAL
COMMUNITY
Start: 2025-01-06
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
## 2025-01-16 NOTE — PROGRESS NOTES
January 16, 2025    Hello, may I speak with Roger Brambila?    My name is Binta      I am  with [facility]  [address]  [facility] [address] [phone].    Before we get started may I verify your date of birth? 1970    I am calling to officially welcome you to our practice and ask about your recent visit. Is this a good time to talk? yes    Tell me about your visit with us. What things went well?  The visit went well, everyone was friendly.       We're always looking for ways to make our patients' experiences even better. Do you have recommendations on ways we may improve?  no    Overall were you satisfied with your first visit to our practice? yes       I appreciate you taking the time to speak with me today. Is there anything else I can do for you? no      Thank you, and have a great day.

## 2025-01-18 LAB — BACTERIA SPEC ANAEROBE CULT: NORMAL

## 2025-01-20 ENCOUNTER — OFFICE VISIT (OUTPATIENT)
Dept: WOUND CARE | Facility: HOSPITAL | Age: 55
End: 2025-01-20
Payer: OTHER MISCELLANEOUS

## 2025-01-21 ENCOUNTER — HOSPITAL ENCOUNTER (OUTPATIENT)
Dept: SLEEP CENTER | Age: 55
Discharge: HOME | End: 2025-01-23
Payer: COMMERCIAL

## 2025-01-21 DIAGNOSIS — R06.81 WITNESSED APNEIC SPELLS: ICD-10-CM

## 2025-01-21 DIAGNOSIS — G47.33 OBSTRUCTIVE SLEEP APNEA: ICD-10-CM

## 2025-01-21 DIAGNOSIS — R06.83 SNORING: ICD-10-CM

## 2025-01-21 DIAGNOSIS — Z87.820 HISTORY OF TRAUMATIC BRAIN INJURY: ICD-10-CM

## 2025-01-21 PROCEDURE — 95810 POLYSOM 6/> YRS 4/> PARAM: CPT
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
## 2025-01-23 LAB — FUNGUS WND CULT: NORMAL

## 2025-01-27 ENCOUNTER — OFFICE VISIT (OUTPATIENT)
Dept: WOUND CARE | Facility: HOSPITAL | Age: 55
End: 2025-01-27
Payer: OTHER MISCELLANEOUS

## 2025-01-27 PROCEDURE — G0463 HOSPITAL OUTPT CLINIC VISIT: HCPCS

## 2025-01-30 LAB — FUNGUS WND CULT: NORMAL

## 2025-02-06 LAB — FUNGUS WND CULT: NORMAL

## 2025-02-13 LAB — FUNGUS WND CULT: NORMAL

## 2025-02-19 DIAGNOSIS — G47.33 SLEEP APNEA, OBSTRUCTIVE: Primary | ICD-10-CM

## 2025-02-24 LAB — FUNGUS WND CULT: NORMAL

## 2025-02-27 ENCOUNTER — TELEPHONE (OUTPATIENT)
Age: 55
End: 2025-02-27
Payer: COMMERCIAL

## 2025-02-27 NOTE — TELEPHONE ENCOUNTER
Called patient and went over results with his wife, she is going to speak with him before scheduling routine or nail avulsion.   
EOAE (evoked otoacoustic emission)

## 2025-03-25 ENCOUNTER — PATIENT ROUNDING (BHMG ONLY) (OUTPATIENT)
Dept: SURGERY | Facility: CLINIC | Age: 55
End: 2025-03-25
Payer: COMMERCIAL

## 2025-03-25 ENCOUNTER — OFFICE VISIT (OUTPATIENT)
Dept: SURGERY | Facility: CLINIC | Age: 55
End: 2025-03-25
Payer: COMMERCIAL

## 2025-03-25 VITALS
DIASTOLIC BLOOD PRESSURE: 86 MMHG | BODY MASS INDEX: 25.6 KG/M2 | HEIGHT: 72 IN | SYSTOLIC BLOOD PRESSURE: 144 MMHG | WEIGHT: 189 LBS

## 2025-03-25 DIAGNOSIS — K40.90 RIGHT INGUINAL HERNIA: Primary | ICD-10-CM

## 2025-03-25 DIAGNOSIS — Z98.890 HISTORY OF TRACHEOSTOMY: ICD-10-CM

## 2025-03-25 DIAGNOSIS — Z98.890 HISTORY OF PERCUTANEOUS ENDOSCOPIC GASTROSTOMY: ICD-10-CM

## 2025-03-25 PROCEDURE — 99204 OFFICE O/P NEW MOD 45 MIN: CPT | Performed by: SURGERY

## 2025-03-25 RX ORDER — GABAPENTIN 100 MG/1
300 CAPSULE ORAL ONCE
OUTPATIENT
Start: 2025-03-25

## 2025-03-25 RX ORDER — SODIUM CHLORIDE 0.9 % (FLUSH) 0.9 %
10 SYRINGE (ML) INJECTION EVERY 12 HOURS SCHEDULED
OUTPATIENT
Start: 2025-03-25

## 2025-03-25 RX ORDER — SODIUM CHLORIDE 9 MG/ML
40 INJECTION, SOLUTION INTRAVENOUS AS NEEDED
OUTPATIENT
Start: 2025-03-25

## 2025-03-25 RX ORDER — ACETAMINOPHEN 325 MG/1
650 TABLET ORAL ONCE
OUTPATIENT
Start: 2025-03-25 | End: 2025-03-25

## 2025-03-25 RX ORDER — SODIUM CHLORIDE 0.9 % (FLUSH) 0.9 %
10 SYRINGE (ML) INJECTION AS NEEDED
OUTPATIENT
Start: 2025-03-25

## 2025-03-25 RX ORDER — ONDANSETRON 2 MG/ML
4 INJECTION INTRAMUSCULAR; INTRAVENOUS EVERY 6 HOURS PRN
OUTPATIENT
Start: 2025-03-25

## 2025-03-25 RX ORDER — CELECOXIB 100 MG/1
200 CAPSULE ORAL ONCE
OUTPATIENT
Start: 2025-03-25 | End: 2025-03-25
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
## 2025-04-02 ENCOUNTER — TELEPHONE (OUTPATIENT)
Age: 55
End: 2025-04-02
Payer: COMMERCIAL

## 2025-04-02 NOTE — TELEPHONE ENCOUNTER
Spoke to wife regarding appt for tomorrow, advised it was listed as a new problem but we wanted to confirm if it was for nail care or nail removal. Pts wife stated nail care, pt wasn't ready for removal yet. Told her I would notate it for provider. Pts wife verbalized understanding.

## 2025-04-03 ENCOUNTER — OFFICE VISIT (OUTPATIENT)
Age: 55
End: 2025-04-03
Payer: COMMERCIAL

## 2025-04-03 VITALS
WEIGHT: 189 LBS | BODY MASS INDEX: 25.6 KG/M2 | SYSTOLIC BLOOD PRESSURE: 130 MMHG | OXYGEN SATURATION: 96 % | HEIGHT: 72 IN | DIASTOLIC BLOOD PRESSURE: 80 MMHG | HEART RATE: 68 BPM

## 2025-04-03 DIAGNOSIS — E11.42 TYPE 2 DIABETES MELLITUS WITH DIABETIC POLYNEUROPATHY, WITHOUT LONG-TERM CURRENT USE OF INSULIN: ICD-10-CM

## 2025-04-03 DIAGNOSIS — L60.2 ONYCHOGRYPHOSIS: Primary | ICD-10-CM

## 2025-04-03 DIAGNOSIS — L84 FOOT CALLUS: ICD-10-CM

## 2025-04-03 DIAGNOSIS — R26.9 GAIT ABNORMALITY: ICD-10-CM

## 2025-04-03 DIAGNOSIS — M20.42 HAMMER TOE OF LEFT FOOT: ICD-10-CM

## 2025-04-03 DIAGNOSIS — S06.9X9S TRAUMATIC BRAIN INJURY WITH LOSS OF CONSCIOUSNESS, SEQUELA: ICD-10-CM

## 2025-05-20 ENCOUNTER — FOLLOWUP TELEPHONE ENCOUNTER (OUTPATIENT)
Dept: SLEEP CENTER | Age: 55
End: 2025-05-20

## 2025-05-20 NOTE — TELEPHONE ENCOUNTER
Called and spoke to patient wife regarding sleep study and order for auto cpap.  Order for auto cpap was sent to [facility].  Sleep study report was sent to the referring provider.

## 2025-05-22 ENCOUNTER — TELEPHONE (OUTPATIENT)
Dept: NEUROLOGY | Age: 55
End: 2025-05-22

## 2025-06-05 NOTE — TELEPHONE ENCOUNTER
Jennifer salvador comp  called and left a voicemail about the paperwork she had faxed over and a letter that she was needing from Dr Salas and information about a peer/peer to be done. Jennifer stated patient had a sleep study on 1/21/2025 and was seen in our office on 12/30/2024  Jennifer asked for a call back and she can be reached at [phone].  
Returned call, left , advised Dr. Salas does not do peer to peer, nor does he see WC. Also let her know we did not receive any paperwork.  
 used

## 2025-06-10 ENCOUNTER — OFFICE VISIT (OUTPATIENT)
Dept: SURGERY | Facility: CLINIC | Age: 55
End: 2025-06-10
Payer: COMMERCIAL

## 2025-06-10 ENCOUNTER — PRE-ADMISSION TESTING (OUTPATIENT)
Dept: PREADMISSION TESTING | Facility: HOSPITAL | Age: 55
End: 2025-06-10
Payer: COMMERCIAL

## 2025-06-10 VITALS
BODY MASS INDEX: 34.78 KG/M2 | OXYGEN SATURATION: 96 % | HEIGHT: 71 IN | SYSTOLIC BLOOD PRESSURE: 143 MMHG | RESPIRATION RATE: 20 BRPM | WEIGHT: 248.46 LBS | HEART RATE: 67 BPM | DIASTOLIC BLOOD PRESSURE: 77 MMHG

## 2025-06-10 VITALS
BODY MASS INDEX: 33.62 KG/M2 | DIASTOLIC BLOOD PRESSURE: 88 MMHG | HEIGHT: 72 IN | WEIGHT: 248.2 LBS | SYSTOLIC BLOOD PRESSURE: 132 MMHG

## 2025-06-10 DIAGNOSIS — Z98.890 HISTORY OF TRACHEOSTOMY: ICD-10-CM

## 2025-06-10 DIAGNOSIS — K40.90 RIGHT INGUINAL HERNIA: Primary | ICD-10-CM

## 2025-06-10 DIAGNOSIS — Z98.890 HISTORY OF PERCUTANEOUS ENDOSCOPIC GASTROSTOMY: ICD-10-CM

## 2025-06-10 DIAGNOSIS — K40.90 RIGHT INGUINAL HERNIA: ICD-10-CM

## 2025-06-10 LAB
ALBUMIN SERPL-MCNC: 3.7 G/DL (ref 3.5–5.2)
ALBUMIN/GLOB SERPL: 1.2 G/DL
ALP SERPL-CCNC: 97 U/L (ref 39–117)
ALT SERPL W P-5'-P-CCNC: 13 U/L (ref 1–41)
ANION GAP SERPL CALCULATED.3IONS-SCNC: 11 MMOL/L (ref 5–15)
AST SERPL-CCNC: 13 U/L (ref 1–40)
BILIRUB SERPL-MCNC: 0.2 MG/DL (ref 0–1.2)
BUN SERPL-MCNC: 30.9 MG/DL (ref 6–20)
BUN/CREAT SERPL: 34 (ref 7–25)
CALCIUM SPEC-SCNC: 9 MG/DL (ref 8.6–10.5)
CHLORIDE SERPL-SCNC: 104 MMOL/L (ref 98–107)
CO2 SERPL-SCNC: 22 MMOL/L (ref 22–29)
CREAT SERPL-MCNC: 0.91 MG/DL (ref 0.76–1.27)
DEPRECATED RDW RBC AUTO: 44.8 FL (ref 37–54)
EGFRCR SERPLBLD CKD-EPI 2021: 99.5 ML/MIN/1.73
ERYTHROCYTE [DISTWIDTH] IN BLOOD BY AUTOMATED COUNT: 14.1 % (ref 12.3–15.4)
GLOBULIN UR ELPH-MCNC: 3.1 GM/DL
GLUCOSE SERPL-MCNC: 133 MG/DL (ref 65–99)
HCT VFR BLD AUTO: 41.1 % (ref 37.5–51)
HGB BLD-MCNC: 12.7 G/DL (ref 13–17.7)
MCH RBC QN AUTO: 26.7 PG (ref 26.6–33)
MCHC RBC AUTO-ENTMCNC: 30.9 G/DL (ref 31.5–35.7)
MCV RBC AUTO: 86.5 FL (ref 79–97)
PLATELET # BLD AUTO: 279 10*3/MM3 (ref 140–450)
PMV BLD AUTO: 10.2 FL (ref 6–12)
POTASSIUM SERPL-SCNC: 4.5 MMOL/L (ref 3.5–5.2)
PROT SERPL-MCNC: 6.8 G/DL (ref 6–8.5)
RBC # BLD AUTO: 4.75 10*6/MM3 (ref 4.14–5.8)
SODIUM SERPL-SCNC: 137 MMOL/L (ref 136–145)
WBC NRBC COR # BLD AUTO: 8.7 10*3/MM3 (ref 3.4–10.8)

## 2025-06-10 PROCEDURE — 85027 COMPLETE CBC AUTOMATED: CPT

## 2025-06-10 PROCEDURE — 36415 COLL VENOUS BLD VENIPUNCTURE: CPT

## 2025-06-10 PROCEDURE — 93005 ELECTROCARDIOGRAM TRACING: CPT

## 2025-06-10 PROCEDURE — 80053 COMPREHEN METABOLIC PANEL: CPT

## 2025-06-10 RX ORDER — TRAMADOL HYDROCHLORIDE 50 MG/1
50 TABLET ORAL EVERY 8 HOURS PRN
COMMUNITY
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
## 2025-06-10 NOTE — DISCHARGE INSTRUCTIONS

## 2025-06-11 LAB
QT INTERVAL: 410 MS
QTC INTERVAL: 402 MS
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
## 2025-06-13 ENCOUNTER — OFFICE VISIT (OUTPATIENT)
Age: 55
End: 2025-06-13
Payer: COMMERCIAL

## 2025-06-13 ENCOUNTER — HOSPITAL ENCOUNTER (OUTPATIENT)
Dept: GENERAL RADIOLOGY | Facility: HOSPITAL | Age: 55
Discharge: HOME | End: 2025-06-13
Admitting: NURSE PRACTITIONER
Payer: COMMERCIAL

## 2025-06-13 VITALS
BODY MASS INDEX: 33.6 KG/M2 | HEART RATE: 71 BPM | WEIGHT: 240 LBS | SYSTOLIC BLOOD PRESSURE: 130 MMHG | DIASTOLIC BLOOD PRESSURE: 80 MMHG | OXYGEN SATURATION: 98 % | HEIGHT: 71 IN

## 2025-06-13 DIAGNOSIS — M79.672 LEFT FOOT PAIN: ICD-10-CM

## 2025-06-13 DIAGNOSIS — L84 FOOT CALLUS: ICD-10-CM

## 2025-06-13 DIAGNOSIS — R26.9 GAIT ABNORMALITY: ICD-10-CM

## 2025-06-13 DIAGNOSIS — E11.42 TYPE 2 DIABETES MELLITUS WITH DIABETIC POLYNEUROPATHY, WITHOUT LONG-TERM CURRENT USE OF INSULIN: ICD-10-CM

## 2025-06-13 DIAGNOSIS — L60.2 ONYCHOGRYPHOSIS: Primary | ICD-10-CM

## 2025-06-13 DIAGNOSIS — L97.522 CHRONIC ULCER OF TOE OF LEFT FOOT WITH FAT LAYER EXPOSED: ICD-10-CM

## 2025-06-13 DIAGNOSIS — M20.42 HAMMER TOE OF LEFT FOOT: ICD-10-CM

## 2025-06-13 DIAGNOSIS — S06.9X9S TRAUMATIC BRAIN INJURY WITH LOSS OF CONSCIOUSNESS, SEQUELA: ICD-10-CM

## 2025-06-13 DIAGNOSIS — S90.821A BLISTER OF RIGHT FOOT WITHOUT INFECTION, INITIAL ENCOUNTER: ICD-10-CM

## 2025-06-13 PROCEDURE — 87070 CULTURE OTHR SPECIMN AEROBIC: CPT | Performed by: NURSE PRACTITIONER

## 2025-06-13 PROCEDURE — 87077 CULTURE AEROBIC IDENTIFY: CPT | Performed by: NURSE PRACTITIONER

## 2025-06-13 PROCEDURE — 73630 X-RAY EXAM OF FOOT: CPT

## 2025-06-13 PROCEDURE — 87205 SMEAR GRAM STAIN: CPT | Performed by: NURSE PRACTITIONER

## 2025-06-13 PROCEDURE — 87186 SC STD MICRODIL/AGAR DIL: CPT | Performed by: NURSE PRACTITIONER

## 2025-06-13 RX ORDER — DOXYCYCLINE 100 MG/1
100 CAPSULE ORAL 2 TIMES DAILY
Qty: 20 CAPSULE | Refills: 0 | Status: SHIPPED | OUTPATIENT
Start: 2025-06-13 | End: 2025-06-16

## 2025-06-13 RX ORDER — MUPIROCIN 20 MG/G
1 OINTMENT TOPICAL 3 TIMES DAILY
Qty: 90 G | Refills: 2 | Status: SHIPPED | OUTPATIENT
Start: 2025-06-13

## 2025-06-15 LAB
BACTERIA SPEC AEROBE CULT: ABNORMAL
BACTERIA SPEC AEROBE CULT: ABNORMAL
GRAM STN SPEC: ABNORMAL
GRAM STN SPEC: ABNORMAL

## 2025-06-16 ENCOUNTER — RESULTS FOLLOW-UP (OUTPATIENT)
Age: 55
End: 2025-06-16
Payer: COMMERCIAL

## 2025-06-16 DIAGNOSIS — L97.522 CHRONIC ULCER OF TOE OF LEFT FOOT WITH FAT LAYER EXPOSED: Primary | ICD-10-CM

## 2025-06-16 RX ORDER — SULFAMETHOXAZOLE AND TRIMETHOPRIM 800; 160 MG/1; MG/1
1 TABLET ORAL 2 TIMES DAILY
Qty: 20 TABLET | Refills: 0 | Status: SHIPPED | OUTPATIENT
Start: 2025-06-16 | End: 2025-06-26

## 2025-06-18 NOTE — TELEPHONE ENCOUNTER
Called patient to go over xray result. Patient's wife answered the phone and I informed her of the result. Patient's wife stated that patient has an appointment with Wound Care tomorrow 6/19/25. Patient's wife voiced understanding about xray result.    HILARIO Puente

## 2025-06-19 ENCOUNTER — TELEPHONE (OUTPATIENT)
Dept: SURGERY | Facility: CLINIC | Age: 55
End: 2025-06-19
Payer: COMMERCIAL

## 2025-06-19 ENCOUNTER — OFFICE VISIT (OUTPATIENT)
Dept: WOUND CARE | Facility: HOSPITAL | Age: 55
End: 2025-06-19
Payer: COMMERCIAL

## 2025-06-19 PROCEDURE — G0463 HOSPITAL OUTPT CLINIC VISIT: HCPCS

## 2025-06-19 NOTE — TELEPHONE ENCOUNTER
Patient's wife called and stated at his podiatry follow up on 06/13 he was placed on antibiotics for osteomyelitis infection. Wife states they see wound care today and are scheduled to have surgery on Monday with Dr. Youssef and didn't know if any of this would be an issue with upcoming surgery.    I discussed this with Dr. Youssef and he stated patient should be almost done with antibiotics by Monday and we can proceed with surgery as long as patient continues to improve. Patients wife instructed to call the office is patients symptoms aren't improving.

## 2025-06-19 NOTE — TELEPHONE ENCOUNTER
Called Roger to confirm his surgery date 06/23 with an arrival time of 5.   Reminded him not to eat or drink after midnight.   Let him know to come through the main entrance of the hospital and check in at main registration.     CBC  06/19    Patient confirmed

## 2025-06-23 ENCOUNTER — HOSPITAL ENCOUNTER (OUTPATIENT)
Facility: HOSPITAL | Age: 55
Setting detail: HOSPITAL OUTPATIENT SURGERY
Discharge: HOME | End: 2025-06-23
Attending: SURGERY | Admitting: SURGERY
Payer: COMMERCIAL

## 2025-06-23 ENCOUNTER — ANESTHESIA (OUTPATIENT)
Dept: PERIOP | Facility: HOSPITAL | Age: 55
End: 2025-06-23
Payer: COMMERCIAL

## 2025-06-23 ENCOUNTER — ANESTHESIA EVENT (OUTPATIENT)
Dept: PERIOP | Facility: HOSPITAL | Age: 55
End: 2025-06-23
Payer: COMMERCIAL

## 2025-06-23 VITALS
TEMPERATURE: 97.4 F | OXYGEN SATURATION: 92 % | HEART RATE: 63 BPM | RESPIRATION RATE: 16 BRPM | SYSTOLIC BLOOD PRESSURE: 129 MMHG | DIASTOLIC BLOOD PRESSURE: 71 MMHG

## 2025-06-23 DIAGNOSIS — K40.90 RIGHT INGUINAL HERNIA: ICD-10-CM

## 2025-06-23 LAB
GLUCOSE BLDC GLUCOMTR-MCNC: 140 MG/DL (ref 70–130)
GLUCOSE BLDC GLUCOMTR-MCNC: 208 MG/DL (ref 70–130)

## 2025-06-23 PROCEDURE — C1781 MESH (IMPLANTABLE): HCPCS | Performed by: SURGERY

## 2025-06-23 PROCEDURE — 25810000003 LACTATED RINGERS PER 1000 ML: Performed by: SURGERY

## 2025-06-23 PROCEDURE — 49650 LAP ING HERNIA REPAIR INIT: CPT | Performed by: SURGERY

## 2025-06-23 PROCEDURE — 25810000003 SODIUM CHLORIDE PER 500 ML: Performed by: SURGERY

## 2025-06-23 PROCEDURE — 25010000002 CEFAZOLIN PER 500 MG: Performed by: SURGERY

## 2025-06-23 PROCEDURE — 25010000002 PROPOFOL 10 MG/ML EMULSION

## 2025-06-23 PROCEDURE — 25010000002 ROPIVACAINE PER 1 MG: Performed by: SURGERY

## 2025-06-23 PROCEDURE — 25010000002 LIDOCAINE PF 2% 2 % SOLUTION

## 2025-06-23 PROCEDURE — 82948 REAGENT STRIP/BLOOD GLUCOSE: CPT

## 2025-06-23 PROCEDURE — 25010000002 DEXAMETHASONE PER 1 MG

## 2025-06-23 PROCEDURE — 25010000002 SUGAMMADEX 200 MG/2ML SOLUTION

## 2025-06-23 PROCEDURE — 44979 UNLISTED LAPS PX APPENDIX: CPT | Performed by: SURGERY

## 2025-06-23 PROCEDURE — 25010000002 DEXAMETHASONE PER 1 MG: Performed by: ANESTHESIOLOGY

## 2025-06-23 PROCEDURE — 25010000002 FENTANYL CITRATE (PF) 100 MCG/2ML SOLUTION

## 2025-06-23 PROCEDURE — 88304 TISSUE EXAM BY PATHOLOGIST: CPT | Performed by: SURGERY

## 2025-06-23 PROCEDURE — 25010000002 GLYCOPYRROLATE 0.4 MG/2ML SOLUTION

## 2025-06-23 DEVICE — 3DMAX MID ANATOMICAL MESH, 12 CM X 17 CM (5" X 7"), EXTRA LARGE, RIGHT
Type: IMPLANTABLE DEVICE | Site: ABDOMEN | Status: FUNCTIONAL
Brand: 3DMAX

## 2025-06-23 DEVICE — 8MM STAPLER
Type: IMPLANTABLE DEVICE | Site: ABDOMEN | Status: FUNCTIONAL
Brand: SUREFORM

## 2025-06-23 DEVICE — ABSORBABLE WOUND CLOSURE DEVICE
Type: IMPLANTABLE DEVICE | Site: ABDOMEN | Status: FUNCTIONAL
Brand: V-LOC 90

## 2025-06-23 DEVICE — IMPLANTABLE DEVICE: Type: IMPLANTABLE DEVICE | Site: ABDOMEN | Status: FUNCTIONAL

## 2025-06-23 RX ORDER — LIDOCAINE HYDROCHLORIDE 20 MG/ML
INJECTION, SOLUTION EPIDURAL; INFILTRATION; INTRACAUDAL; PERINEURAL AS NEEDED
Status: DISCONTINUED | OUTPATIENT
Start: 2025-06-23 | End: 2025-06-23 | Stop reason: SURG

## 2025-06-23 RX ORDER — SODIUM CHLORIDE 0.9 % (FLUSH) 0.9 %
3 SYRINGE (ML) INJECTION EVERY 12 HOURS SCHEDULED
Status: DISCONTINUED | OUTPATIENT
Start: 2025-06-23 | End: 2025-06-23 | Stop reason: HOSPADM

## 2025-06-23 RX ORDER — HYDROCODONE BITARTRATE AND ACETAMINOPHEN 5; 325 MG/1; MG/1
1 TABLET ORAL EVERY 4 HOURS PRN
Status: DISCONTINUED | OUTPATIENT
Start: 2025-06-23 | End: 2025-06-23 | Stop reason: HOSPADM

## 2025-06-23 RX ORDER — MIDAZOLAM HYDROCHLORIDE 2 MG/2ML
2 INJECTION, SOLUTION INTRAMUSCULAR; INTRAVENOUS
Status: DISCONTINUED | OUTPATIENT
Start: 2025-06-23 | End: 2025-06-23 | Stop reason: HOSPADM

## 2025-06-23 RX ORDER — SODIUM CHLORIDE 9 MG/ML
INJECTION, SOLUTION INTRAVENOUS AS NEEDED
Status: DISCONTINUED | OUTPATIENT
Start: 2025-06-23 | End: 2025-06-23 | Stop reason: HOSPADM

## 2025-06-23 RX ORDER — CELECOXIB 200 MG/1
200 CAPSULE ORAL ONCE
Status: COMPLETED | OUTPATIENT
Start: 2025-06-23 | End: 2025-06-23

## 2025-06-23 RX ORDER — SODIUM CHLORIDE 9 MG/ML
40 INJECTION, SOLUTION INTRAVENOUS AS NEEDED
Status: DISCONTINUED | OUTPATIENT
Start: 2025-06-23 | End: 2025-06-23 | Stop reason: HOSPADM

## 2025-06-23 RX ORDER — SODIUM CHLORIDE 0.9 % (FLUSH) 0.9 %
10 SYRINGE (ML) INJECTION AS NEEDED
Status: DISCONTINUED | OUTPATIENT
Start: 2025-06-23 | End: 2025-06-23 | Stop reason: HOSPADM

## 2025-06-23 RX ORDER — SODIUM CHLORIDE, SODIUM LACTATE, POTASSIUM CHLORIDE, CALCIUM CHLORIDE 600; 310; 30; 20 MG/100ML; MG/100ML; MG/100ML; MG/100ML
1000 INJECTION, SOLUTION INTRAVENOUS CONTINUOUS
Status: DISCONTINUED | OUTPATIENT
Start: 2025-06-23 | End: 2025-06-23 | Stop reason: HOSPADM

## 2025-06-23 RX ORDER — ACETAMINOPHEN 325 MG/1
650 TABLET ORAL ONCE
Status: COMPLETED | OUTPATIENT
Start: 2025-06-23 | End: 2025-06-23

## 2025-06-23 RX ORDER — HYDROCODONE BITARTRATE AND ACETAMINOPHEN 10; 325 MG/1; MG/1
1 TABLET ORAL EVERY 4 HOURS PRN
Status: DISCONTINUED | OUTPATIENT
Start: 2025-06-23 | End: 2025-06-23 | Stop reason: HOSPADM

## 2025-06-23 RX ORDER — NEOSTIGMINE METHYLSULFATE 5 MG/5 ML
SYRINGE (ML) INTRAVENOUS AS NEEDED
Status: DISCONTINUED | OUTPATIENT
Start: 2025-06-23 | End: 2025-06-23 | Stop reason: SURG

## 2025-06-23 RX ORDER — PROPOFOL 10 MG/ML
VIAL (ML) INTRAVENOUS AS NEEDED
Status: DISCONTINUED | OUTPATIENT
Start: 2025-06-23 | End: 2025-06-23 | Stop reason: SURG

## 2025-06-23 RX ORDER — NALOXONE HCL 0.4 MG/ML
0.4 VIAL (ML) INJECTION AS NEEDED
Status: DISCONTINUED | OUTPATIENT
Start: 2025-06-23 | End: 2025-06-23 | Stop reason: HOSPADM

## 2025-06-23 RX ORDER — SODIUM CHLORIDE 0.9 % (FLUSH) 0.9 %
10 SYRINGE (ML) INJECTION EVERY 12 HOURS SCHEDULED
Status: DISCONTINUED | OUTPATIENT
Start: 2025-06-23 | End: 2025-06-23 | Stop reason: HOSPADM

## 2025-06-23 RX ORDER — DEXAMETHASONE SODIUM PHOSPHATE 4 MG/ML
4 INJECTION, SOLUTION INTRA-ARTICULAR; INTRALESIONAL; INTRAMUSCULAR; INTRAVENOUS; SOFT TISSUE ONCE AS NEEDED
Status: COMPLETED | OUTPATIENT
Start: 2025-06-23 | End: 2025-06-23

## 2025-06-23 RX ORDER — FENTANYL CITRATE 50 UG/ML
50 INJECTION, SOLUTION INTRAMUSCULAR; INTRAVENOUS
Status: DISCONTINUED | OUTPATIENT
Start: 2025-06-23 | End: 2025-06-23 | Stop reason: HOSPADM

## 2025-06-23 RX ORDER — GLYCOPYRROLATE 0.2 MG/ML
INJECTION INTRAMUSCULAR; INTRAVENOUS AS NEEDED
Status: DISCONTINUED | OUTPATIENT
Start: 2025-06-23 | End: 2025-06-23 | Stop reason: SURG

## 2025-06-23 RX ORDER — SODIUM CHLORIDE, SODIUM LACTATE, POTASSIUM CHLORIDE, CALCIUM CHLORIDE 600; 310; 30; 20 MG/100ML; MG/100ML; MG/100ML; MG/100ML
100 INJECTION, SOLUTION INTRAVENOUS CONTINUOUS
Status: DISCONTINUED | OUTPATIENT
Start: 2025-06-23 | End: 2025-06-23 | Stop reason: HOSPADM

## 2025-06-23 RX ORDER — DEXAMETHASONE SODIUM PHOSPHATE 4 MG/ML
INJECTION, SOLUTION INTRA-ARTICULAR; INTRALESIONAL; INTRAMUSCULAR; INTRAVENOUS; SOFT TISSUE AS NEEDED
Status: DISCONTINUED | OUTPATIENT
Start: 2025-06-23 | End: 2025-06-23 | Stop reason: SURG

## 2025-06-23 RX ORDER — FLUMAZENIL 0.1 MG/ML
0.2 INJECTION INTRAVENOUS AS NEEDED
Status: DISCONTINUED | OUTPATIENT
Start: 2025-06-23 | End: 2025-06-23 | Stop reason: HOSPADM

## 2025-06-23 RX ORDER — LABETALOL HYDROCHLORIDE 5 MG/ML
5 INJECTION, SOLUTION INTRAVENOUS
Status: DISCONTINUED | OUTPATIENT
Start: 2025-06-23 | End: 2025-06-23 | Stop reason: HOSPADM

## 2025-06-23 RX ORDER — ONDANSETRON 2 MG/ML
4 INJECTION INTRAMUSCULAR; INTRAVENOUS ONCE AS NEEDED
Status: DISCONTINUED | OUTPATIENT
Start: 2025-06-23 | End: 2025-06-23 | Stop reason: HOSPADM

## 2025-06-23 RX ORDER — ROCURONIUM BROMIDE 10 MG/ML
INJECTION, SOLUTION INTRAVENOUS AS NEEDED
Status: DISCONTINUED | OUTPATIENT
Start: 2025-06-23 | End: 2025-06-23 | Stop reason: SURG

## 2025-06-23 RX ORDER — EPHEDRINE SULFATE 50 MG/ML
INJECTION INTRAVENOUS AS NEEDED
Status: DISCONTINUED | OUTPATIENT
Start: 2025-06-23 | End: 2025-06-23 | Stop reason: SURG

## 2025-06-23 RX ORDER — LIDOCAINE HYDROCHLORIDE 10 MG/ML
0.5 INJECTION, SOLUTION EPIDURAL; INFILTRATION; INTRACAUDAL; PERINEURAL ONCE AS NEEDED
Status: DISCONTINUED | OUTPATIENT
Start: 2025-06-23 | End: 2025-06-23 | Stop reason: HOSPADM

## 2025-06-23 RX ORDER — ACETAMINOPHEN 500 MG
1000 TABLET ORAL ONCE
Status: DISCONTINUED | OUTPATIENT
Start: 2025-06-23 | End: 2025-06-23 | Stop reason: HOSPADM

## 2025-06-23 RX ORDER — GABAPENTIN 300 MG/1
300 CAPSULE ORAL ONCE
Status: COMPLETED | OUTPATIENT
Start: 2025-06-23 | End: 2025-06-23

## 2025-06-23 RX ORDER — SODIUM CHLORIDE 0.9 % (FLUSH) 0.9 %
3 SYRINGE (ML) INJECTION AS NEEDED
Status: DISCONTINUED | OUTPATIENT
Start: 2025-06-23 | End: 2025-06-23 | Stop reason: HOSPADM

## 2025-06-23 RX ORDER — SODIUM CHLORIDE 0.9 % (FLUSH) 0.9 %
3-10 SYRINGE (ML) INJECTION AS NEEDED
Status: DISCONTINUED | OUTPATIENT
Start: 2025-06-23 | End: 2025-06-23 | Stop reason: HOSPADM

## 2025-06-23 RX ORDER — FENTANYL CITRATE 50 UG/ML
INJECTION, SOLUTION INTRAMUSCULAR; INTRAVENOUS AS NEEDED
Status: DISCONTINUED | OUTPATIENT
Start: 2025-06-23 | End: 2025-06-23 | Stop reason: SURG

## 2025-06-23 RX ORDER — IBUPROFEN 600 MG/1
600 TABLET, FILM COATED ORAL EVERY 6 HOURS PRN
Status: DISCONTINUED | OUTPATIENT
Start: 2025-06-23 | End: 2025-06-23 | Stop reason: HOSPADM

## 2025-06-23 RX ORDER — OXYCODONE HYDROCHLORIDE 5 MG/1
5 TABLET ORAL EVERY 4 HOURS PRN
Qty: 18 TABLET | Refills: 0 | Status: SHIPPED | OUTPATIENT
Start: 2025-06-23 | End: 2025-06-26

## 2025-06-23 RX ADMIN — GLYCOPYRROLATE 0.4 MG: 0.2 INJECTION INTRAMUSCULAR; INTRAVENOUS at 10:03

## 2025-06-23 RX ADMIN — LIDOCAINE HYDROCHLORIDE 100 MG: 20 INJECTION, SOLUTION EPIDURAL; INFILTRATION; INTRACAUDAL; PERINEURAL at 07:58

## 2025-06-23 RX ADMIN — SUGAMMADEX 200 MG: 100 INJECTION, SOLUTION INTRAVENOUS at 10:12

## 2025-06-23 RX ADMIN — Medication 4 MG: at 10:03

## 2025-06-23 RX ADMIN — GABAPENTIN 300 MG: 300 CAPSULE ORAL at 06:01

## 2025-06-23 RX ADMIN — EPHEDRINE SULFATE 10 MG: 50 INJECTION INTRAVENOUS at 10:09

## 2025-06-23 RX ADMIN — CEFAZOLIN 2000 MG: 2 INJECTION, POWDER, FOR SOLUTION INTRAMUSCULAR; INTRAVENOUS at 08:06

## 2025-06-23 RX ADMIN — EPHEDRINE SULFATE 15 MG: 50 INJECTION INTRAVENOUS at 08:09

## 2025-06-23 RX ADMIN — SODIUM CHLORIDE, POTASSIUM CHLORIDE, SODIUM LACTATE AND CALCIUM CHLORIDE 1000 ML: 600; 310; 30; 20 INJECTION, SOLUTION INTRAVENOUS at 05:58

## 2025-06-23 RX ADMIN — FENTANYL CITRATE 100 MCG: 50 INJECTION, SOLUTION INTRAMUSCULAR; INTRAVENOUS at 08:20

## 2025-06-23 RX ADMIN — DEXAMETHASONE SODIUM PHOSPHATE 4 MG: 4 INJECTION, SOLUTION INTRA-ARTICULAR; INTRALESIONAL; INTRAMUSCULAR; INTRAVENOUS; SOFT TISSUE at 06:43

## 2025-06-23 RX ADMIN — ROCURONIUM BROMIDE 20 MG: 10 INJECTION, SOLUTION INTRAVENOUS at 09:08

## 2025-06-23 RX ADMIN — CELECOXIB 200 MG: 200 CAPSULE ORAL at 06:01

## 2025-06-23 RX ADMIN — DEXAMETHASONE SODIUM PHOSPHATE 4 MG: 4 INJECTION, SOLUTION INTRA-ARTICULAR; INTRALESIONAL; INTRAMUSCULAR; INTRAVENOUS; SOFT TISSUE at 08:14

## 2025-06-23 RX ADMIN — FENTANYL CITRATE 100 MCG: 50 INJECTION, SOLUTION INTRAMUSCULAR; INTRAVENOUS at 07:58

## 2025-06-23 RX ADMIN — ROCURONIUM BROMIDE 50 MG: 10 INJECTION, SOLUTION INTRAVENOUS at 07:58

## 2025-06-23 RX ADMIN — ACETAMINOPHEN 650 MG: 325 TABLET ORAL at 06:01

## 2025-06-23 RX ADMIN — PROPOFOL 160 MG: 10 INJECTION, EMULSION INTRAVENOUS at 07:58

## 2025-06-23 NOTE — ANESTHESIA PROCEDURE NOTES
Airway  Reason: elective    Date/Time: 6/23/2025 7:59 AM  Airway not difficult    General Information and Staff    Patient location during procedure: OR  CRNA/CAA: Bennie Lemus CRNA    Indications and Patient Condition  Indications for airway management: airway protection    Preoxygenated: yes    Mask difficulty assessment: 1 - vent by mask    Final Airway Details    Final airway type: endotracheal airway      Successful airway: ETT  Cuffed: yes   Successful intubation technique: direct laryngoscopy  Adjuncts used in placement: intubating stylet  Endotracheal tube insertion site: oral  Blade: Gregory  Blade size: 2  ETT size (mm): 7.5  Cormack-Lehane Classification: grade I - full view of glottis  Placement verified by: capnometry   Cuff volume (mL): 9  Measured from: lips  ETT/EBT  to lips (cm): 23  Number of attempts at approach: 1  Assessment: lips, teeth, and gum same as pre-op and atraumatic intubation

## 2025-06-23 NOTE — ANESTHESIA POSTPROCEDURE EVALUATION
Patient: Roger Brambila    Procedure Summary       Date: 06/23/25 Room / Location:  [facility] 06 /  [facility]    Anesthesia Start: 0754 Anesthesia Stop: 1018    Procedure: LAPAROSCOPIC ROBOTIC ASSISTED BILATERAL INGUINAL HERNIA REPAIR WITH MESH, APPENDECTOMY (Bilateral: Abdomen) Diagnosis:       Right inguinal hernia      (Right inguinal hernia [K40.90])    Surgeons: Mateus Youssef MD Provider: Bennie Lemus CRNA    Anesthesia Type: general ASA Status: 3            Anesthesia Type: general    Vitals  Vitals Value Taken Time   /73 06/23/25 11:03   Temp 97.4 °F (36.3 °C) 06/23/25 10:45   Pulse 62 06/23/25 11:05   Resp 16 06/23/25 11:03   SpO2 91 % 06/23/25 11:05   Vitals shown include unfiled device data.        Post Anesthesia Care and Evaluation    Patient location during evaluation: PACU  Patient participation: complete - patient participated  Level of consciousness: awake and alert  Pain management: adequate    Airway patency: patent  Anesthetic complications: No anesthetic complications    Cardiovascular status: acceptable  Respiratory status: acceptable  Hydration status: acceptable    Comments: Blood pressure 134/73, pulse 61, temperature 97.4 °F (36.3 °C), temperature source Temporal, resp. rate 16, SpO2 91%.    Pt discharged from PACU based on meme score >8  No anesthesia care post op

## 2025-06-23 NOTE — OP NOTE
OPERATIVE NOTE    Patient Name:  Roger Brambila  YOB: 1970  MRN:  [id number]    Date of Surgery:  6/23/2025    PREOPERATIVE DIAGNOSIS: Right inguinal hernia    POSTOPERATIVE DIAGNOSIS: Bilateral inguinal hernias, right Amyand hernia    PROCEDURE(S):  Robotic bilateral inguinal hernia repair with mesh     ATTENDING SURGEON:  Mateus Youssef MD    ADDITIONAL SURGEON:  None    ASSISTANT(S):  Evangelist Russo CST     SPECIMENS: Appendix for permanent     ANESTHESIA: General Endotracheal anesthesia with bilateral TAP block    ESTIMATED BLOOD LOSS: 1 mL    FLUIDS: 1000 mL    WOUND CLASSIFICATION: Class II, clean contaminated    IMPLANTS: Right and left sided XL Bard 3D Max mid-weight mesh    DRAINS:  None    FINDINGS:   1.  Large right sided inguinoscrotal hernia containing cecum, appendix and small bowel  2.  Moderate sized left-sided indirect inguinal hernia containing sigmoid colon  3.  Unexpected appendectomy performed due to the appendix being densely adherent within the hernia defect with concerns for injury to the appendix during reduction    INDICATIONS: The patient is a 55 y.o. male with symptomatic bilateral inguinal hernias.    DESCRIPTION OF PROCEDURE:   The patient was seen in the preoperative holding area. Informed consent was obtained from the patient. The patient was taken to the operating room and placed on the operating room table in the supine position. SCDs were placed on both legs.  Ancef 2 g was ministered for preoperative antibiotics.  General anesthesia was administered and the patient was intubated without difficulty. The patient was prepped and draped in the usual sterile fashion. A full surgical timeout was performed verifying the correct patient, correct procedure and correct site for surgery.     Local anesthesia was infiltrated in the left upper quadrant at Willard's point. A small skin incision was made using an 11 blade scalpel and a Veress needle was inserted with 2 satisfactory  clicks. An excellent saline drop test confirmed correct intra-abdominal placement. Pneumoperitoneum was initiated to 12 mmHg.  Local anesthesia was injected at the usual trocar sites inversely across the abdomen. Small incisions were made and a robotic 8 mm trocar was inserted under direct visualization. Safe abdominal entry was confirmed after examining the peritoneal cavity. The Veress needle stick site was examined and was hemostatic. The remainder of the trocars were placed under direct visualization.  The robot was docked and instruments were inserted safely under direct visualization..  The patient was placed in Trendelenburg.  On the left, there was a moderate sized indirect inguinal hernia containing sigmoid colon.  On the right, the patient had a large inguinoscrotal hernia containing small bowel, cecum and appendix.  Starting on the right, 4 cm cephalad to the defect an incision was made into the peritoneum.  This was extended medially to the medial umbilical ligament, and laterally towards the ASIS.  Using meticulous dissection, a preperitoneal plane was developed creating a generous peritoneal flap.  Medially the dissection was taken 2 cm across the midline and 2 cm inferiorly beyond the pubic bone.  The inferior dissection was taken down to the femoral vessels and the iliopsoas muscle.  The inguinal canal was explored and was free of any occult cord lipomas.  Working from medial to lateral and lateral to medial, the cord structures were isolated.  The contents of the hernia required a significant amount of manual pressure to reduce.  The very large hernia sac was only partially able to be dissected off of the spermatic cord, therefore it was amputated and the distal half was left in the scrotum.  The cord vessels and the vas deferens, were clearly identified and preserved.  Once the hernia sac was divided and the cecum was fully reduced, the appendix was found to be densely adherent to the distal  portion of the hernia sac.  The appendix was carefully dissected off of the hernia sac however manual retraction needed to be was applied to the appendix, therefore there was concern for compromise of the appendix.  Next, attention was directed to the left groin.  The sigmoid colon was easily reduced out of the defect.  4 cm cephalad to the hernia defect an incision was made into the peritoneum.  This was extended transversely medially towards the umbilical ligament and laterally towards the ASIS.  Using meticulous dissection, a preperitoneal plane was developed creating a generous peritoneal flap.  Medially the dissection was connected to the contralateral dissection plane.  Inferiorly this was taken 2 cm below the pubic bone and to the femoral vessels, and the iliopsoas muscle.  The inguinal canal was explored.  There was a moderate-sized cord lipoma which was fully reduced and dissected off of the cord structures.  The vessels and the vas deferens were clearly identified and preserved.    The direct, indirect and femoral spaces were dissected out and a critical view of safety was obtained of both right and left myopectineal orifices.the large size of the right indirect inguinal hernia defect, this was plicated closed using an absorbable 3-0 V-Loc suture. A right and left sided extra-large Bard 3D max mid weight mesh were selected for placement.  There was excellent coverage on all sides.  The left and right side mesh were secured in place to the pubis and the rectus muscle using a 2-0 Vicryl suture.   The peritoneal flaps were then closed using absorbable 3-V-Loc sutures.  The large right sided peritoneal defect was run closed using an absorbable 3-0 V-Loc suture.  A smaller left-sided peritoneal defect was closed using a 2-0 Vicryl suture in a figure-of-eight fashion.  Once both peritoneal flaps were completely closed and the mesh were excluded, given the tenuous appearance of the appendix, I elected to perform  an appendectomy.  The mesentery of the appendix was divided using scissors and bipolar electrocautery.  An 8 mm robotic stapler was used to divide the appendix at the base where there was healthy appearing tissue.  The staple line was intact and hemostatic.  The appendix was placed into an Endo Catch bag and removed to the right sided trocar site.  This was closed using a 0 Vicryl suture in a transfascial suture passer.  Once a satisfactory exam had been performed and a preliminary count was correct, pneumoperitoneum was released and the trocars were removed under direct visualization.  Incisions were closed using 4-0 Vicryl sutures and dressed with Mastisol Steri-Strips.    At the completion of the procedure, all sharp, sponge and instrument counts were correct x2. The patient was awoken from anesthesia and extubated in the operating room.  The patient was taken to the postanesthesia care unit in stable condition without any immediate complications.    Mateus Youssef MD  6/23/2025  10:44 CDT    Please note that portions of this note were completed with a voice recognition program.
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
## 2025-06-24 ENCOUNTER — TELEPHONE (OUTPATIENT)
Dept: SURGERY | Facility: CLINIC | Age: 55
End: 2025-06-24
Payer: COMMERCIAL

## 2025-06-24 ENCOUNTER — TELEPHONE (OUTPATIENT)
Age: 55
End: 2025-06-24

## 2025-06-24 NOTE — TELEPHONE ENCOUNTER
Caller:  KEDAR CARRENO    Relationship: [employer]OU[employer]    Best call back number: [phone]    What is your medical concern? SPOKE WITH SPOUSE- SHE STATES THAT THE PT WAS NOT ABLE TO TAKE THE DOXAZOSIN- STATING THAT THE PT'S  WOUND WAS RESISTANT TO THAT MEDICATION    SPOUSE  STATES THAT BACTROBAN WAS CALLED INTO THE PHARMACY- BUT WHEN THE WIFE WENT BACK TO PICK IT UP ANOTHER REFILL FOR THE BACTROBAN - IT WAS THE DOXAZOSIN    PLEASE SEND THE CORRECT MEDICATION TO THE PHARMACY      OKAY TO CALL ONCE REFILL HAS BEEN SENT- YES    OKAY TO LVM- YES    HAS LESS THAN A 3 DAY SUPPLY- YES    [facility] - [place], [place] - [address] [place] [address] - [phone]  - [phone] FX       PLEASE CONTACT MS Charley CARRENO AND ADVISE

## 2025-06-24 NOTE — TELEPHONE ENCOUNTER
Post OP phone call visit:    Type of surgery: Robotic bilateral inguinal hernia repair with mesh.  How are you feeling? Doing okay.  Are you having any pain or Nausea? Really Sore. No nausea.   Do you have a normal appetite? Yes.   Are you passing gas or having any BM? NO BM. Encouraged Miralax and a stool softener.   Urinating okay? Yes.  How is your activity? Okay.   Any drainage or fever? No redness. No drainage. No fever.

## 2025-06-25 ENCOUNTER — TELEPHONE (OUTPATIENT)
Age: 55
End: 2025-06-25
Payer: COMMERCIAL

## 2025-06-25 NOTE — TELEPHONE ENCOUNTER
Called and spoke with patient's wife. I asked her if she had gone to [facility] to  the Bactrim that was sent in to treat the bacteria in the patient's wound. Patient's wife stated that [facility] gave her the Doxycycline when she went to  other medications. I informed patient's wife that she will need to contact the pharmacy and see if they have filled the Bactrim since it was electronically sent on 6/16/25. Patient's wife stated that she is going to call the pharmacy about the Bactrim. Patient's wife voiced understanding.    SOPHIA PuenteA

## 2025-06-27 LAB
CYTO UR: NORMAL
LAB AP CASE REPORT: NORMAL
Lab: NORMAL
PATH REPORT.FINAL DX SPEC: NORMAL
PATH REPORT.GROSS SPEC: NORMAL

## 2025-06-30 ENCOUNTER — APPOINTMENT (OUTPATIENT)
Dept: CT IMAGING | Facility: HOSPITAL | Age: 55
End: 2025-06-30
Payer: COMMERCIAL

## 2025-06-30 ENCOUNTER — HOSPITAL ENCOUNTER (OUTPATIENT)
Facility: HOSPITAL | Age: 55
Setting detail: OBSERVATION
Discharge: HOME | End: 2025-07-01
Attending: EMERGENCY MEDICINE | Admitting: SURGERY
Payer: COMMERCIAL

## 2025-06-30 ENCOUNTER — TELEPHONE (OUTPATIENT)
Dept: SURGERY | Facility: CLINIC | Age: 55
End: 2025-06-30
Payer: COMMERCIAL

## 2025-06-30 DIAGNOSIS — R10.31 RIGHT INGUINAL PAIN: ICD-10-CM

## 2025-06-30 DIAGNOSIS — K40.90 RIGHT INGUINAL HERNIA: ICD-10-CM

## 2025-06-30 DIAGNOSIS — T14.8XXA HEMATOMA: Primary | ICD-10-CM

## 2025-06-30 LAB
ALBUMIN SERPL-MCNC: 3.5 G/DL (ref 3.5–5.2)
ALBUMIN/GLOB SERPL: 1 G/DL
ALP SERPL-CCNC: 120 U/L (ref 39–117)
ALT SERPL W P-5'-P-CCNC: 10 U/L (ref 1–41)
ANION GAP SERPL CALCULATED.3IONS-SCNC: 10 MMOL/L (ref 5–15)
AST SERPL-CCNC: 11 U/L (ref 1–40)
BASOPHILS # BLD AUTO: 0.1 10*3/MM3 (ref 0–0.2)
BASOPHILS NFR BLD AUTO: 1 % (ref 0–1.5)
BILIRUB SERPL-MCNC: 0.2 MG/DL (ref 0–1.2)
BUN SERPL-MCNC: 22.7 MG/DL (ref 6–20)
BUN/CREAT SERPL: 31.1 (ref 7–25)
CALCIUM SPEC-SCNC: 9 MG/DL (ref 8.6–10.5)
CHLORIDE SERPL-SCNC: 102 MMOL/L (ref 98–107)
CO2 SERPL-SCNC: 23 MMOL/L (ref 22–29)
CREAT SERPL-MCNC: 0.73 MG/DL (ref 0.76–1.27)
DEPRECATED RDW RBC AUTO: 45.9 FL (ref 37–54)
EGFRCR SERPLBLD CKD-EPI 2021: 107.4 ML/MIN/1.73
EOSINOPHIL # BLD AUTO: 0.14 10*3/MM3 (ref 0–0.4)
EOSINOPHIL NFR BLD AUTO: 1.5 % (ref 0.3–6.2)
ERYTHROCYTE [DISTWIDTH] IN BLOOD BY AUTOMATED COUNT: 14.4 % (ref 12.3–15.4)
GLOBULIN UR ELPH-MCNC: 3.6 GM/DL
GLUCOSE SERPL-MCNC: 161 MG/DL (ref 65–99)
HCT VFR BLD AUTO: 41.3 % (ref 37.5–51)
HGB BLD-MCNC: 12.8 G/DL (ref 13–17.7)
IMM GRANULOCYTES # BLD AUTO: 0.32 10*3/MM3 (ref 0–0.05)
IMM GRANULOCYTES NFR BLD AUTO: 3.3 % (ref 0–0.5)
LYMPHOCYTES # BLD AUTO: 1.16 10*3/MM3 (ref 0.7–3.1)
LYMPHOCYTES NFR BLD AUTO: 12 % (ref 19.6–45.3)
MCH RBC QN AUTO: 26.8 PG (ref 26.6–33)
MCHC RBC AUTO-ENTMCNC: 31 G/DL (ref 31.5–35.7)
MCV RBC AUTO: 86.6 FL (ref 79–97)
MONOCYTES # BLD AUTO: 0.9 10*3/MM3 (ref 0.1–0.9)
MONOCYTES NFR BLD AUTO: 9.3 % (ref 5–12)
NEUTROPHILS NFR BLD AUTO: 7.03 10*3/MM3 (ref 1.7–7)
NEUTROPHILS NFR BLD AUTO: 72.9 % (ref 42.7–76)
NRBC BLD AUTO-RTO: 0 /100 WBC (ref 0–0.2)
PLATELET # BLD AUTO: 278 10*3/MM3 (ref 140–450)
PMV BLD AUTO: 10 FL (ref 6–12)
POTASSIUM SERPL-SCNC: 4.6 MMOL/L (ref 3.5–5.2)
PROT SERPL-MCNC: 7.1 G/DL (ref 6–8.5)
RBC # BLD AUTO: 4.77 10*6/MM3 (ref 4.14–5.8)
SODIUM SERPL-SCNC: 135 MMOL/L (ref 136–145)
WBC NRBC COR # BLD AUTO: 9.65 10*3/MM3 (ref 3.4–10.8)

## 2025-06-30 PROCEDURE — 96374 THER/PROPH/DIAG INJ IV PUSH: CPT

## 2025-06-30 PROCEDURE — G0378 HOSPITAL OBSERVATION PER HR: HCPCS

## 2025-06-30 PROCEDURE — 80053 COMPREHEN METABOLIC PANEL: CPT | Performed by: EMERGENCY MEDICINE

## 2025-06-30 PROCEDURE — 96375 TX/PRO/DX INJ NEW DRUG ADDON: CPT

## 2025-06-30 PROCEDURE — 25010000002 HYDROMORPHONE PER 4 MG: Performed by: SURGERY

## 2025-06-30 PROCEDURE — 74177 CT ABD & PELVIS W/CONTRAST: CPT

## 2025-06-30 PROCEDURE — 85025 COMPLETE CBC W/AUTO DIFF WBC: CPT | Performed by: EMERGENCY MEDICINE

## 2025-06-30 PROCEDURE — 96372 THER/PROPH/DIAG INJ SC/IM: CPT

## 2025-06-30 PROCEDURE — 25010000002 MORPHINE PER 10 MG: Performed by: EMERGENCY MEDICINE

## 2025-06-30 PROCEDURE — 99285 EMERGENCY DEPT VISIT HI MDM: CPT | Performed by: EMERGENCY MEDICINE

## 2025-06-30 PROCEDURE — 25510000001 IOPAMIDOL 61 % SOLUTION: Performed by: EMERGENCY MEDICINE

## 2025-06-30 PROCEDURE — 25010000002 ENOXAPARIN PER 10 MG: Performed by: SURGERY

## 2025-06-30 PROCEDURE — 25010000002 ONDANSETRON PER 1 MG: Performed by: EMERGENCY MEDICINE

## 2025-06-30 RX ORDER — ESCITALOPRAM OXALATE 10 MG/1
20 TABLET ORAL DAILY
Status: DISCONTINUED | OUTPATIENT
Start: 2025-07-01 | End: 2025-07-01 | Stop reason: HOSPADM

## 2025-06-30 RX ORDER — DOXYCYCLINE 100 MG/1
100 CAPSULE ORAL EVERY 12 HOURS SCHEDULED
Status: DISCONTINUED | OUTPATIENT
Start: 2025-06-30 | End: 2025-07-01 | Stop reason: HOSPADM

## 2025-06-30 RX ORDER — BISACODYL 10 MG
10 SUPPOSITORY, RECTAL RECTAL DAILY PRN
Status: DISCONTINUED | OUTPATIENT
Start: 2025-06-30 | End: 2025-07-01 | Stop reason: HOSPADM

## 2025-06-30 RX ORDER — GABAPENTIN 100 MG/1
100 CAPSULE ORAL EVERY 8 HOURS SCHEDULED
Status: DISCONTINUED | OUTPATIENT
Start: 2025-06-30 | End: 2025-07-01 | Stop reason: HOSPADM

## 2025-06-30 RX ORDER — AMOXICILLIN 250 MG
2 CAPSULE ORAL 2 TIMES DAILY PRN
Status: DISCONTINUED | OUTPATIENT
Start: 2025-06-30 | End: 2025-07-01 | Stop reason: HOSPADM

## 2025-06-30 RX ORDER — AMLODIPINE BESYLATE 10 MG/1
10 TABLET ORAL DAILY
Status: DISCONTINUED | OUTPATIENT
Start: 2025-07-01 | End: 2025-07-01 | Stop reason: HOSPADM

## 2025-06-30 RX ORDER — PROPRANOLOL HCL 20 MG
20 TABLET ORAL 3 TIMES DAILY
Status: DISCONTINUED | OUTPATIENT
Start: 2025-06-30 | End: 2025-07-01 | Stop reason: HOSPADM

## 2025-06-30 RX ORDER — SODIUM CHLORIDE 9 MG/ML
40 INJECTION, SOLUTION INTRAVENOUS AS NEEDED
Status: DISCONTINUED | OUTPATIENT
Start: 2025-06-30 | End: 2025-07-01 | Stop reason: HOSPADM

## 2025-06-30 RX ORDER — BISACODYL 5 MG/1
5 TABLET, DELAYED RELEASE ORAL DAILY PRN
Status: DISCONTINUED | OUTPATIENT
Start: 2025-06-30 | End: 2025-07-01 | Stop reason: HOSPADM

## 2025-06-30 RX ORDER — ATORVASTATIN CALCIUM 40 MG/1
40 TABLET, FILM COATED ORAL DAILY
Status: DISCONTINUED | OUTPATIENT
Start: 2025-07-01 | End: 2025-07-01 | Stop reason: HOSPADM

## 2025-06-30 RX ORDER — SODIUM CHLORIDE 0.9 % (FLUSH) 0.9 %
10 SYRINGE (ML) INJECTION AS NEEDED
Status: DISCONTINUED | OUTPATIENT
Start: 2025-06-30 | End: 2025-07-01

## 2025-06-30 RX ORDER — PANTOPRAZOLE SODIUM 40 MG/1
40 TABLET, DELAYED RELEASE ORAL
Status: DISCONTINUED | OUTPATIENT
Start: 2025-07-01 | End: 2025-07-01 | Stop reason: HOSPADM

## 2025-06-30 RX ORDER — ONDANSETRON 2 MG/ML
4 INJECTION INTRAMUSCULAR; INTRAVENOUS ONCE
Status: COMPLETED | OUTPATIENT
Start: 2025-06-30 | End: 2025-06-30

## 2025-06-30 RX ORDER — FAMOTIDINE 20 MG/1
20 TABLET, FILM COATED ORAL 2 TIMES DAILY
Status: DISCONTINUED | OUTPATIENT
Start: 2025-06-30 | End: 2025-07-01 | Stop reason: HOSPADM

## 2025-06-30 RX ORDER — SODIUM CHLORIDE 0.9 % (FLUSH) 0.9 %
10 SYRINGE (ML) INJECTION EVERY 12 HOURS SCHEDULED
Status: DISCONTINUED | OUTPATIENT
Start: 2025-06-30 | End: 2025-07-01 | Stop reason: HOSPADM

## 2025-06-30 RX ORDER — TERAZOSIN 1 MG/1
1 CAPSULE ORAL NIGHTLY
Status: DISCONTINUED | OUTPATIENT
Start: 2025-06-30 | End: 2025-07-01 | Stop reason: HOSPADM

## 2025-06-30 RX ORDER — SODIUM CHLORIDE 0.9 % (FLUSH) 0.9 %
10 SYRINGE (ML) INJECTION AS NEEDED
Status: DISCONTINUED | OUTPATIENT
Start: 2025-06-30 | End: 2025-07-01 | Stop reason: HOSPADM

## 2025-06-30 RX ORDER — HYDROMORPHONE HYDROCHLORIDE 1 MG/ML
0.5 INJECTION, SOLUTION INTRAMUSCULAR; INTRAVENOUS; SUBCUTANEOUS EVERY 4 HOURS PRN
Refills: 0 | Status: DISCONTINUED | OUTPATIENT
Start: 2025-06-30 | End: 2025-07-01 | Stop reason: HOSPADM

## 2025-06-30 RX ORDER — IOPAMIDOL 612 MG/ML
100 INJECTION, SOLUTION INTRAVASCULAR
Status: COMPLETED | OUTPATIENT
Start: 2025-06-30 | End: 2025-06-30

## 2025-06-30 RX ORDER — DOXYCYCLINE 100 MG/1
100 CAPSULE ORAL 2 TIMES DAILY
COMMUNITY

## 2025-06-30 RX ORDER — OXYCODONE HYDROCHLORIDE 5 MG/1
5 TABLET ORAL EVERY 4 HOURS PRN
Refills: 0 | Status: DISCONTINUED | OUTPATIENT
Start: 2025-06-30 | End: 2025-07-01 | Stop reason: HOSPADM

## 2025-06-30 RX ORDER — ACETAMINOPHEN 500 MG
1000 TABLET ORAL EVERY 8 HOURS SCHEDULED
Status: DISCONTINUED | OUTPATIENT
Start: 2025-06-30 | End: 2025-07-01 | Stop reason: HOSPADM

## 2025-06-30 RX ORDER — OXYCODONE HYDROCHLORIDE 5 MG/1
10 TABLET ORAL EVERY 4 HOURS PRN
Refills: 0 | Status: DISCONTINUED | OUTPATIENT
Start: 2025-06-30 | End: 2025-07-01 | Stop reason: HOSPADM

## 2025-06-30 RX ORDER — ESCITALOPRAM OXALATE 10 MG/1
10 TABLET ORAL DAILY
Status: ON HOLD | COMMUNITY
End: 2025-06-30

## 2025-06-30 RX ORDER — TRAMADOL HYDROCHLORIDE 50 MG/1
50 TABLET ORAL EVERY 8 HOURS PRN
Status: DISCONTINUED | OUTPATIENT
Start: 2025-06-30 | End: 2025-06-30

## 2025-06-30 RX ORDER — POLYETHYLENE GLYCOL 3350 17 G/17G
17 POWDER, FOR SOLUTION ORAL DAILY PRN
Status: DISCONTINUED | OUTPATIENT
Start: 2025-06-30 | End: 2025-07-01 | Stop reason: HOSPADM

## 2025-06-30 RX ORDER — TRAZODONE HYDROCHLORIDE 50 MG/1
50 TABLET ORAL NIGHTLY
Status: DISCONTINUED | OUTPATIENT
Start: 2025-06-30 | End: 2025-07-01 | Stop reason: HOSPADM

## 2025-06-30 RX ORDER — ESCITALOPRAM OXALATE 20 MG/1
20 TABLET ORAL DAILY
COMMUNITY

## 2025-06-30 RX ORDER — ENOXAPARIN SODIUM 100 MG/ML
40 INJECTION SUBCUTANEOUS DAILY
Status: DISCONTINUED | OUTPATIENT
Start: 2025-06-30 | End: 2025-07-01 | Stop reason: HOSPADM

## 2025-06-30 RX ADMIN — GABAPENTIN 100 MG: 100 CAPSULE ORAL at 20:48

## 2025-06-30 RX ADMIN — HYDROMORPHONE HYDROCHLORIDE 0.5 MG: 1 INJECTION, SOLUTION INTRAMUSCULAR; INTRAVENOUS; SUBCUTANEOUS at 16:35

## 2025-06-30 RX ADMIN — DOXYCYCLINE 100 MG: 100 CAPSULE ORAL at 22:53

## 2025-06-30 RX ADMIN — TERAZOSIN HYDROCHLORIDE 1 MG: 1 CAPSULE ORAL at 23:18

## 2025-06-30 RX ADMIN — ENOXAPARIN SODIUM 40 MG: 100 INJECTION SUBCUTANEOUS at 18:28

## 2025-06-30 RX ADMIN — METFORMIN HYDROCHLORIDE 1000 MG: 500 TABLET ORAL at 22:55

## 2025-06-30 RX ADMIN — ONDANSETRON 4 MG: 2 INJECTION, SOLUTION INTRAMUSCULAR; INTRAVENOUS at 11:04

## 2025-06-30 RX ADMIN — MORPHINE SULFATE 4 MG: 4 INJECTION, SOLUTION INTRAMUSCULAR; INTRAVENOUS at 11:04

## 2025-06-30 RX ADMIN — Medication 10 ML: at 20:48

## 2025-06-30 RX ADMIN — ACETAMINOPHEN 1000 MG: 500 TABLET, FILM COATED ORAL at 14:24

## 2025-06-30 RX ADMIN — TRAZODONE HYDROCHLORIDE 50 MG: 50 TABLET ORAL at 22:53

## 2025-06-30 RX ADMIN — IOPAMIDOL 100 ML: 612 INJECTION, SOLUTION INTRAVENOUS at 11:41

## 2025-06-30 RX ADMIN — GABAPENTIN 100 MG: 100 CAPSULE ORAL at 14:24

## 2025-06-30 RX ADMIN — PROPRANOLOL HYDROCHLORIDE 20 MG: 20 TABLET ORAL at 22:53

## 2025-06-30 RX ADMIN — FAMOTIDINE 20 MG: 20 TABLET, FILM COATED ORAL at 22:53

## 2025-06-30 RX ADMIN — ACETAMINOPHEN 1000 MG: 500 TABLET, FILM COATED ORAL at 20:48
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
## 2025-06-30 NOTE — TELEPHONE ENCOUNTER
Patient's wife called and stated that he had surgery last Monday and was doing well then over the weekend he has had an increase in pain and it has gotten so bad he can hardly walk.  Discussed this with Dr. Youssef and he Advised to go to ER patient would need a scan.  I discussed this with patients wife and they are going to the ER to be evaluated.

## 2025-06-30 NOTE — PLAN OF CARE
Goal Outcome Evaluation:  Plan of Care Reviewed With: patient           Outcome Evaluation: Pt arrived from ED AxOx4, 4 lap incisions on abdomen from inguinal hernia repair (1 month ago)  Wound on 2nd toe lt foot and top of rt foot and great toe.  Edema noted to scrotal area.   Pt has personal cane at bedside.   placed on pt.  Call light within reach.
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
## 2025-07-01 VITALS
TEMPERATURE: 98 F | WEIGHT: 237 LBS | HEART RATE: 62 BPM | DIASTOLIC BLOOD PRESSURE: 66 MMHG | BODY MASS INDEX: 33.18 KG/M2 | SYSTOLIC BLOOD PRESSURE: 152 MMHG | OXYGEN SATURATION: 99 % | HEIGHT: 71 IN | RESPIRATION RATE: 16 BRPM

## 2025-07-01 PROCEDURE — G0378 HOSPITAL OBSERVATION PER HR: HCPCS

## 2025-07-01 RX ORDER — OXYCODONE HYDROCHLORIDE 5 MG/1
5 TABLET ORAL EVERY 4 HOURS PRN
Qty: 18 TABLET | Refills: 0 | Status: SHIPPED | OUTPATIENT
Start: 2025-07-01 | End: 2025-07-04

## 2025-07-01 RX ADMIN — Medication 400 MG: at 08:46

## 2025-07-01 RX ADMIN — DOXYCYCLINE 100 MG: 100 CAPSULE ORAL at 08:51

## 2025-07-01 RX ADMIN — ACETAMINOPHEN 1000 MG: 500 TABLET, FILM COATED ORAL at 06:04

## 2025-07-01 RX ADMIN — AMLODIPINE BESYLATE 10 MG: 10 TABLET ORAL at 08:46

## 2025-07-01 RX ADMIN — EMPAGLIFLOZIN 25 MG: 10 TABLET, FILM COATED ORAL at 08:45

## 2025-07-01 RX ADMIN — ATORVASTATIN CALCIUM 40 MG: 40 TABLET, FILM COATED ORAL at 08:46

## 2025-07-01 RX ADMIN — PROPRANOLOL HYDROCHLORIDE 20 MG: 20 TABLET ORAL at 08:46

## 2025-07-01 RX ADMIN — METFORMIN HYDROCHLORIDE 1000 MG: 500 TABLET ORAL at 08:45

## 2025-07-01 RX ADMIN — PANTOPRAZOLE SODIUM 40 MG: 40 TABLET, DELAYED RELEASE ORAL at 06:04

## 2025-07-01 RX ADMIN — OXYCODONE HYDROCHLORIDE 10 MG: 5 TABLET ORAL at 08:46

## 2025-07-01 RX ADMIN — GABAPENTIN 100 MG: 100 CAPSULE ORAL at 06:04

## 2025-07-01 RX ADMIN — FAMOTIDINE 20 MG: 20 TABLET, FILM COATED ORAL at 08:45

## 2025-07-01 RX ADMIN — ESCITALOPRAM OXALATE 20 MG: 10 TABLET ORAL at 08:46

## 2025-07-01 NOTE — PLAN OF CARE
Problem: Adult Inpatient Plan of Care  Goal: Plan of Care Review  Outcome: Progressing  Flowsheets (Taken 7/1/2025 0302)  Progress: improving  Outcome Evaluation: Patient A&Ox4. Edema present in scrotum. Voiding per urinal. C/O mild pain overnight. Call light in reach, safety maintained.  Plan of Care Reviewed With: patient

## 2025-07-01 NOTE — PLAN OF CARE
Goal Outcome Evaluation:  Plan of Care Reviewed With: patient        Progress: improving  Outcome Evaluation: Pt A&Ox4. Up stand-by ast. Scrotum red swollen but improving per pt. Oral abx and pain meds given. Family at BS. Plans to DC home in  progress. Call light in reach. Safety maintained.

## 2025-07-03 ENCOUNTER — OFFICE VISIT (OUTPATIENT)
Dept: WOUND CARE | Facility: HOSPITAL | Age: 55
End: 2025-07-03
Payer: COMMERCIAL

## 2025-07-04 NOTE — DISCHARGE SUMMARY
"General Surgery Discharge Summary    PATIENT NAME:  Roger Brambila    YOB: 1970    MEDICAL RECORD NUMBER: [id number]    DATE OF ADMISSION: 6/30/2025    DATE OF DISCHARGE: 7/4/2025      SUBJECTIVE    The patient reports that he is feeling much better today.  Medication has helped alleviate his pain.  He is voiding appropriately.  He is tolerating a regular diet.    ALLERGIES  No Known Allergies    MEDICATIONS:  No current facility-administered medications for this encounter.      OBJECTIVE    VITAL SIGNS  /66 (BP Location: Right arm, Patient Position: Lying)   Pulse 62   Temp 98 °F (36.7 °C) (Oral)   Resp 16   Ht 180.3 cm (71\")   Wt 108 kg (237 lb)   SpO2 99%   BMI 33.05 kg/m²   No intake or output data in the 24 hours ending 07/04/25 1544    PHYSICAL EXAM    General: Elderly male, sitting up in bed, in no acute distress.  HEENT:  NCAT, PERRL, EOM intact bilaterally, hearing intact, nares patent  Heart:  Regular rate, normotensive, no JVD bilaterally  Lungs:  Normal respiratory effort, regular respiratory rate, no wheezing  Abdomen: Flat, soft, incisions are clean dry and intact  Extremities:  No cyanosis, clubbing or edema.   Musculoskeletal:  Normal development of bilateral upper extremities. Normal development of bilateral lower extremities.  Range of motion intact.  No evidence of trauma.  Skin:  No rashes, ecchymosis or jaundice. Dry and non-diaphoretic. Skin color is consistent with ethnicity.    FINAL DIAGNOSES:     Hematoma        CONSULTING SERVICES: General Surgery     PROCEDURES PERFORMED: None    HISTORY: The patient is a 55 y.o. male with a large right sided inguinoscrotal hernia who had undergone a robotic right inguinal hernia repair with mesh on 6/23/2025.  He developed worsening right groin pain and swelling 2 days ago therefore he presented to the emergency department.  A CT of the abdomen and pelvis showed a 16 cm fluid collection concerning for hematoma versus " seroma     HOSPITAL COURSE: The patient was admitted for pain control and evaluation.  He was treated conservatively and improved immediately.     CONDITION: The patient is in stable condition.    DIET: Regular     DISCHARGE MEDICATIONS:   1. All previous home medications.   2.  Oxycodone     DISCHARGE INSTRUCTIONS:  The patient has been instructed to follow up with Dr. Youssef in 4 weeks.    Okay to shower 2 days after surgery.  Avoid soaking incision under water for 2 weeks.  The patient may perform light activities after surgery.  Avoid lifting more than 20 pounds or excessive straining for 4 weeks after surgery.  If appropriate, alternate ibuprofen and tylenol scheduled around-the-clock. Take prescription pain medication exactly as directed.  Take a stool softener while on prescription pain medication to avoid constipation.  Okay to drive once off of pain medication. Continue on the appropriate discharge diet.  The patient may return to work in 1-2 weeks on light duty or been 4 weeks on full duty, if fully recovered from surgery.  Cleared for regular activity after 4 weeks.  Absorbable sutures will dissolve with time and Steri-Strips will fall off in 3 to 4 weeks.  If external sutures or staples are present, they will need to be removed in 2 to 3 weeks from surgery.    Mateus Youssef MD  7/4/2025  15:44 CDT    Please note that portions of this note were completed with a voice recognition program.

## 2025-07-08 ENCOUNTER — HOSPITAL ENCOUNTER (OUTPATIENT)
Dept: SLEEP CENTER | Age: 55
Discharge: HOME | End: 2025-07-10
Payer: COMMERCIAL

## 2025-07-08 PROCEDURE — 95811 POLYSOM 6/>YRS CPAP 4/> PARM: CPT
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
## 2025-07-10 ENCOUNTER — OFFICE VISIT (OUTPATIENT)
Dept: WOUND CARE | Facility: HOSPITAL | Age: 55
End: 2025-07-10
Payer: COMMERCIAL

## 2025-07-10 LAB
25(OH)D3 SERPL-MCNC: 46.4 NG/ML
ALBUMIN SERPL-MCNC: 3.7 G/DL (ref 3.5–5.2)
ALP SERPL-CCNC: 126 U/L (ref 40–129)
ALT SERPL-CCNC: 12 U/L (ref 10–50)
ANION GAP SERPL CALCULATED.3IONS-SCNC: 11 MMOL/L (ref 8–16)
AST SERPL-CCNC: 15 U/L (ref 10–50)
BACTERIA URNS QL MICRO: NEGATIVE /HPF
BASOPHILS # BLD: 0.1 K/UL (ref 0–0.2)
BASOPHILS NFR BLD: 0.9 % (ref 0–1)
BILIRUB SERPL-MCNC: 0.3 MG/DL (ref 0.2–1.2)
BILIRUB UR QL STRIP: NEGATIVE
BUN SERPL-MCNC: 29 MG/DL (ref 6–20)
CALCIUM SERPL-MCNC: 9.3 MG/DL (ref 8.6–10)
CHLORIDE SERPL-SCNC: 100 MMOL/L (ref 98–107)
CHOLEST SERPL-MCNC: 126 MG/DL (ref 0–199)
CLARITY UR: CLEAR
CO2 SERPL-SCNC: 25 MMOL/L (ref 22–29)
COLOR UR: YELLOW
CREAT SERPL-MCNC: 1 MG/DL (ref 0.7–1.2)
CREAT UR-MCNC: 82.7 MG/DL (ref 39–259)
CRYSTALS URNS MICRO: NORMAL /HPF
EOSINOPHIL # BLD: 0.3 K/UL (ref 0–0.6)
EOSINOPHIL NFR BLD: 2.8 % (ref 0–5)
EPI CELLS #/AREA URNS AUTO: 1 /HPF (ref 0–5)
ERYTHROCYTE [DISTWIDTH] IN BLOOD BY AUTOMATED COUNT: 14.4 % (ref 11.5–14.5)
GLUCOSE SERPL-MCNC: 148 MG/DL (ref 70–99)
GLUCOSE UR STRIP.AUTO-MCNC: =>1000 MG/DL
HBA1C MFR BLD: 7.8 % (ref 4–5.6)
HCT VFR BLD AUTO: 42.9 % (ref 42–52)
HDLC SERPL-MCNC: 46 MG/DL (ref 40–60)
HGB BLD-MCNC: 13.2 G/DL (ref 14–18)
HGB UR STRIP.AUTO-MCNC: ABNORMAL MG/L
HYALINE CASTS #/AREA URNS AUTO: 0 /HPF (ref 0–8)
IMM GRANULOCYTES # BLD: 0.2 K/UL
KETONES UR STRIP.AUTO-MCNC: NEGATIVE MG/DL
LDLC SERPL CALC-MCNC: 42 MG/DL
LEUKOCYTE ESTERASE UR QL STRIP.AUTO: NEGATIVE
LYMPHOCYTES # BLD: 1.1 K/UL (ref 1.1–4.5)
LYMPHOCYTES NFR BLD: 10.8 % (ref 20–40)
MAGNESIUM SERPL-MCNC: 1.8 MG/DL (ref 1.6–2.6)
MCH RBC QN AUTO: 26.7 PG (ref 27–31)
MCHC RBC AUTO-ENTMCNC: 30.8 G/DL (ref 33–37)
MCV RBC AUTO: 86.8 FL (ref 80–94)
MONOCYTES # BLD: 1 K/UL (ref 0–0.9)
MONOCYTES NFR BLD: 9.7 % (ref 0–10)
NEUTROPHILS # BLD: 7.6 K/UL (ref 1.5–7.5)
NEUTS SEG NFR BLD: 73.7 % (ref 50–65)
NITRITE UR QL STRIP.AUTO: NEGATIVE
PH UR STRIP.AUTO: 6 [PH] (ref 5–8)
PHOSPHATE SERPL-MCNC: 2.6 MG/DL (ref 2.5–4.5)
PLATELET # BLD AUTO: 363 K/UL (ref 130–400)
PMV BLD AUTO: 10.7 FL (ref 9.4–12.4)
POTASSIUM SERPL-SCNC: 5.7 MMOL/L (ref 3.5–5.1)
PROT SERPL-MCNC: 7.2 G/DL (ref 6.4–8.3)
PROT UR STRIP.AUTO-MCNC: 300 MG/DL
PROT UR-MCNC: 230 MG/DL (ref 0–12)
PSA SERPL-MCNC: 0.53 NG/ML (ref 0–4)
PTH-INTACT SERPL-MCNC: 27.4 PG/ML (ref 15–65)
RBC # BLD AUTO: 4.94 M/UL (ref 4.7–6.1)
RBC #/AREA URNS AUTO: 4 /HPF (ref 0–4)
SODIUM SERPL-SCNC: 136 MMOL/L (ref 136–145)
SP GR UR STRIP.AUTO: 1.04 (ref 1–1.03)
TRIGL SERPL-MCNC: 191 MG/DL (ref 0–149)
URATE SERPL-MCNC: 4.8 MG/DL (ref 3.4–7)
UROBILINOGEN UR STRIP.AUTO-MCNC: 0.2 E.U./DL
WBC # BLD AUTO: 10.3 K/UL (ref 4.8–10.8)
WBC #/AREA URNS AUTO: 2 /HPF (ref 0–5)

## 2025-07-18 ENCOUNTER — OFFICE VISIT (OUTPATIENT)
Dept: WOUND CARE | Facility: HOSPITAL | Age: 55
End: 2025-07-18
Payer: COMMERCIAL

## 2025-07-18 PROCEDURE — G0463 HOSPITAL OUTPT CLINIC VISIT: HCPCS

## 2025-07-22 ENCOUNTER — OFFICE VISIT (OUTPATIENT)
Dept: SURGERY | Facility: CLINIC | Age: 55
End: 2025-07-22
Payer: COMMERCIAL

## 2025-07-22 VITALS
SYSTOLIC BLOOD PRESSURE: 128 MMHG | HEIGHT: 71 IN | BODY MASS INDEX: 33.52 KG/M2 | DIASTOLIC BLOOD PRESSURE: 84 MMHG | WEIGHT: 239.4 LBS

## 2025-07-22 DIAGNOSIS — Z87.19 HISTORY OF ROBOT-ASSISTED REPAIR OF RIGHT INGUINAL HERNIA: ICD-10-CM

## 2025-07-22 DIAGNOSIS — Z98.890 HISTORY OF ROBOT-ASSISTED REPAIR OF RIGHT INGUINAL HERNIA: ICD-10-CM

## 2025-07-22 DIAGNOSIS — N50.89 SCROTAL SWELLING: ICD-10-CM

## 2025-07-22 DIAGNOSIS — Z90.49 HISTORY OF LAPAROSCOPIC APPENDECTOMY: ICD-10-CM

## 2025-07-22 DIAGNOSIS — Z87.19 HISTORY OF ROBOT-ASSISTED REPAIR OF LEFT INGUINAL HERNIA: Primary | ICD-10-CM

## 2025-07-22 DIAGNOSIS — Z98.890 HISTORY OF ROBOT-ASSISTED REPAIR OF LEFT INGUINAL HERNIA: Primary | ICD-10-CM

## 2025-07-22 PROCEDURE — 99024 POSTOP FOLLOW-UP VISIT: CPT | Performed by: SURGERY

## 2025-07-22 RX ORDER — HYDROCHLOROTHIAZIDE 12.5 MG/1
12.5 CAPSULE ORAL EVERY MORNING
COMMUNITY
Start: 2025-07-17
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
## 2025-07-31 ENCOUNTER — OFFICE VISIT (OUTPATIENT)
Dept: WOUND CARE | Facility: HOSPITAL | Age: 55
End: 2025-07-31
Payer: COMMERCIAL

## 2025-07-31 PROCEDURE — G0463 HOSPITAL OUTPT CLINIC VISIT: HCPCS

## 2025-08-11 DIAGNOSIS — G47.33 SLEEP APNEA, OBSTRUCTIVE: ICD-10-CM

## 2025-08-16 DIAGNOSIS — G47.33 OBSTRUCTIVE SLEEP APNEA: Primary | ICD-10-CM

## 2025-08-16 DIAGNOSIS — Z99.89 CPAP (CONTINUOUS POSITIVE AIRWAY PRESSURE) DEPENDENCE: ICD-10-CM

## 2025-08-19 ENCOUNTER — FOLLOWUP TELEPHONE ENCOUNTER (OUTPATIENT)
Dept: SLEEP CENTER | Age: 55
End: 2025-08-19

## (undated) DEVICE — SENSR O2 OXIMAX FNGR A/ 18IN NONSTR

## (undated) DEVICE — SYR LUERLOK 50ML

## (undated) DEVICE — MASK,OXYGEN,MED CONC,ADLT,7' TUB, UC: Brand: PENDING

## (undated) DEVICE — SYR LL TP 10ML STRL

## (undated) DEVICE — STRIP,CLOSURE,WOUND,MEDI-STRIP,1/2X4: Brand: MEDLINE

## (undated) DEVICE — TBG INSUFFLATION LUER LOCK: Brand: MEDLINE INDUSTRIES, INC.

## (undated) DEVICE — TIP COVER ACCESSORY

## (undated) DEVICE — ARM DRAPE

## (undated) DEVICE — 3M™ IOBAN™ 2 ANTIMICROBIAL INCISE DRAPE 6650EZ: Brand: IOBAN™ 2

## (undated) DEVICE — KT CLN CLEANOR SCPE

## (undated) DEVICE — SEAL

## (undated) DEVICE — DAVINCI: Brand: MEDLINE INDUSTRIES, INC.

## (undated) DEVICE — Device: Brand: DEFENDO AIR/WATER/SUCTION AND BIOPSY VALVE

## (undated) DEVICE — ELECTRD BLD EZ CLN MOD XLNG 2.75IN

## (undated) DEVICE — THE CHANNEL CLEANING BRUSH IS A NYLON FLEXI BRUSH ATTACHED TO A FLEXIBLE PLASTIC SHEATH DESIGNED TO SAFELY REMOVE DEBRIS FROM FLEXIBLE ENDOSCOPES.

## (undated) DEVICE — PK POSTN TRENGUARD450 PROC

## (undated) DEVICE — CATH IV ANGIO FEP 14GA 5.25IN ORNG 10PK

## (undated) DEVICE — ATHLETIC SUPPORTER LATEX FREE, XLARGE

## (undated) DEVICE — 4-PORT MANIFOLD: Brand: NEPTUNE 2

## (undated) DEVICE — ENDO KIT,LOURDES HOSPITAL: Brand: MEDLINE INDUSTRIES, INC.

## (undated) DEVICE — Device

## (undated) DEVICE — GLV SURG SENSICARE W/ALOE PF LF 7.5 STRL

## (undated) DEVICE — NDL FLTR BLNT 18G 1 1/2IN

## (undated) DEVICE — DECANTER: Brand: UNBRANDED

## (undated) DEVICE — ANTIBACTERIAL UNDYED BRAIDED (POLYGLACTIN 910), SYNTHETIC ABSORBABLE SUTURE: Brand: COATED VICRYL

## (undated) DEVICE — ADHS LIQ MASTISOL 2/3ML

## (undated) DEVICE — BLADELESS OBTURATOR: Brand: WECK VISTA

## (undated) DEVICE — FORCEPS BX L240CM JAW DIA2.4MM ORNG L CAP W/ NDL DISP RAD

## (undated) DEVICE — SNAR POLYP SENSATION MICRO OVL 13 240X40

## (undated) DEVICE — PATIENT RETURN ELECTRODE, SINGLE-USE, CONTACT QUALITY MONITORING, ADULT, WITH 9FT CORD, FOR PATIENTS WEIGING OVER 33LBS. (15KG): Brand: MEGADYNE

## (undated) DEVICE — TBG SMPL FLTR LINE NASL 02/C02 A/ BX/100

## (undated) DEVICE — YANKAUER,BULB TIP WITH VENT: Brand: ARGYLE

## (undated) DEVICE — THE SINGLE USE ETRAP – POLYP TRAP IS USED FOR SUCTION RETRIEVAL OF ENDOSCOPICALLY REMOVED POLYPS.: Brand: ETRAP

## (undated) DEVICE — CUFF,BP,DISP,1 TUBE,ADULT,HP: Brand: MEDLINE

## (undated) DEVICE — 8MM STAPLER: Brand: SUREFORM